# Patient Record
Sex: MALE | Race: BLACK OR AFRICAN AMERICAN | Employment: OTHER | URBAN - METROPOLITAN AREA
[De-identification: names, ages, dates, MRNs, and addresses within clinical notes are randomized per-mention and may not be internally consistent; named-entity substitution may affect disease eponyms.]

---

## 2018-06-29 ENCOUNTER — HOSPITAL ENCOUNTER (OUTPATIENT)
Age: 63
Discharge: HOME | End: 2018-07-01
Payer: COMMERCIAL

## 2018-06-29 ENCOUNTER — HOSPITAL ENCOUNTER (OUTPATIENT)
Dept: GENERAL RADIOLOGY | Age: 63
Discharge: HOME | End: 2018-07-01
Payer: COMMERCIAL

## 2018-06-29 DIAGNOSIS — Z72.0 TOBACCO ABUSE: ICD-10-CM

## 2018-06-29 PROCEDURE — 71046 X-RAY EXAM CHEST 2 VIEWS: CPT

## 2018-09-03 ENCOUNTER — HOSPITAL ENCOUNTER (EMERGENCY)
Age: 63
Discharge: HOME | End: 2018-09-03
Attending: EMERGENCY MEDICINE
Payer: COMMERCIAL

## 2018-09-03 ENCOUNTER — APPOINTMENT (OUTPATIENT)
Dept: GENERAL RADIOLOGY | Age: 63
End: 2018-09-03
Payer: COMMERCIAL

## 2018-09-03 VITALS
HEART RATE: 91 BPM | HEIGHT: 69 IN | WEIGHT: 150 LBS | RESPIRATION RATE: 16 BRPM | TEMPERATURE: 98.1 F | BODY MASS INDEX: 22.22 KG/M2 | DIASTOLIC BLOOD PRESSURE: 92 MMHG | OXYGEN SATURATION: 98 % | SYSTOLIC BLOOD PRESSURE: 146 MMHG

## 2018-09-03 DIAGNOSIS — L08.9 FINGER INFECTION: Primary | ICD-10-CM

## 2018-09-03 LAB
ALBUMIN SERPL-MCNC: 4.1 G/DL (ref 3.5–5.2)
ALP BLD-CCNC: 83 U/L (ref 40–129)
ALT SERPL-CCNC: 11 U/L (ref 0–40)
ANION GAP SERPL CALCULATED.3IONS-SCNC: 14 MMOL/L (ref 7–16)
AST SERPL-CCNC: 20 U/L (ref 0–39)
BASOPHILS ABSOLUTE: 0.02 E9/L (ref 0–0.2)
BASOPHILS RELATIVE PERCENT: 0.3 % (ref 0–2)
BILIRUB SERPL-MCNC: 0.3 MG/DL (ref 0–1.2)
BUN BLDV-MCNC: 13 MG/DL (ref 8–23)
C-REACTIVE PROTEIN: 4 MG/DL (ref 0–0.4)
CALCIUM SERPL-MCNC: 10.2 MG/DL (ref 8.6–10.2)
CHLORIDE BLD-SCNC: 96 MMOL/L (ref 98–107)
CO2: 28 MMOL/L (ref 22–29)
CREAT SERPL-MCNC: 1 MG/DL (ref 0.7–1.2)
EOSINOPHILS ABSOLUTE: 0.04 E9/L (ref 0.05–0.5)
EOSINOPHILS RELATIVE PERCENT: 0.6 % (ref 0–6)
GFR AFRICAN AMERICAN: >60
GFR NON-AFRICAN AMERICAN: >60 ML/MIN/1.73
GLUCOSE BLD-MCNC: 106 MG/DL (ref 74–109)
HCT VFR BLD CALC: 44 % (ref 37–54)
HEMOGLOBIN: 14.1 G/DL (ref 12.5–16.5)
IMMATURE GRANULOCYTES #: 0.02 E9/L
IMMATURE GRANULOCYTES %: 0.3 % (ref 0–5)
LACTIC ACID: 1.4 MMOL/L (ref 0.5–2.2)
LYMPHOCYTES ABSOLUTE: 2.19 E9/L (ref 1.5–4)
LYMPHOCYTES RELATIVE PERCENT: 33.3 % (ref 20–42)
MCH RBC QN AUTO: 23.7 PG (ref 26–35)
MCHC RBC AUTO-ENTMCNC: 32 % (ref 32–34.5)
MCV RBC AUTO: 74.1 FL (ref 80–99.9)
MONOCYTES ABSOLUTE: 0.33 E9/L (ref 0.1–0.95)
MONOCYTES RELATIVE PERCENT: 5 % (ref 2–12)
NEUTROPHILS ABSOLUTE: 3.97 E9/L (ref 1.8–7.3)
NEUTROPHILS RELATIVE PERCENT: 60.5 % (ref 43–80)
PDW BLD-RTO: 16.3 FL (ref 11.5–15)
PLATELET # BLD: 346 E9/L (ref 130–450)
PMV BLD AUTO: 9.6 FL (ref 7–12)
POTASSIUM SERPL-SCNC: 3.7 MMOL/L (ref 3.5–5)
RBC # BLD: 5.94 E12/L (ref 3.8–5.8)
SEDIMENTATION RATE, ERYTHROCYTE: 16 MM/HR (ref 0–15)
SODIUM BLD-SCNC: 138 MMOL/L (ref 132–146)
TOTAL PROTEIN: 8.7 G/DL (ref 6.4–8.3)
WBC # BLD: 6.6 E9/L (ref 4.5–11.5)

## 2018-09-03 PROCEDURE — 85025 COMPLETE CBC W/AUTO DIFF WBC: CPT

## 2018-09-03 PROCEDURE — 86140 C-REACTIVE PROTEIN: CPT

## 2018-09-03 PROCEDURE — 80053 COMPREHEN METABOLIC PANEL: CPT

## 2018-09-03 PROCEDURE — 10060 I&D ABSCESS SIMPLE/SINGLE: CPT

## 2018-09-03 PROCEDURE — 73120 X-RAY EXAM OF HAND: CPT

## 2018-09-03 PROCEDURE — 99283 EMERGENCY DEPT VISIT LOW MDM: CPT

## 2018-09-03 PROCEDURE — 6370000000 HC RX 637 (ALT 250 FOR IP): Performed by: PHYSICIAN ASSISTANT

## 2018-09-03 PROCEDURE — 85651 RBC SED RATE NONAUTOMATED: CPT

## 2018-09-03 PROCEDURE — 83605 ASSAY OF LACTIC ACID: CPT

## 2018-09-03 PROCEDURE — 36415 COLL VENOUS BLD VENIPUNCTURE: CPT

## 2018-09-03 RX ORDER — BACITRACIN, NEOMYCIN, POLYMYXIN B 400; 3.5; 5 [USP'U]/G; MG/G; [USP'U]/G
OINTMENT TOPICAL
Qty: 30 G | Refills: 0 | Status: SHIPPED | OUTPATIENT
Start: 2018-09-03 | End: 2018-09-13

## 2018-09-03 RX ORDER — NAPROXEN 500 MG/1
500 TABLET ORAL 2 TIMES DAILY
Qty: 14 TABLET | Refills: 0 | Status: SHIPPED | OUTPATIENT
Start: 2018-09-03 | End: 2019-08-10 | Stop reason: ALTCHOICE

## 2018-09-03 RX ORDER — SULFAMETHOXAZOLE AND TRIMETHOPRIM 800; 160 MG/1; MG/1
1 TABLET ORAL ONCE
Status: COMPLETED | OUTPATIENT
Start: 2018-09-03 | End: 2018-09-03

## 2018-09-03 RX ORDER — CEPHALEXIN 500 MG/1
500 CAPSULE ORAL ONCE
Status: COMPLETED | OUTPATIENT
Start: 2018-09-03 | End: 2018-09-03

## 2018-09-03 RX ORDER — CEPHALEXIN 500 MG/1
500 CAPSULE ORAL 2 TIMES DAILY
Qty: 14 CAPSULE | Refills: 0 | Status: SHIPPED | OUTPATIENT
Start: 2018-09-03 | End: 2018-09-10

## 2018-09-03 RX ORDER — SULFAMETHOXAZOLE AND TRIMETHOPRIM 800; 160 MG/1; MG/1
1 TABLET ORAL 2 TIMES DAILY
Qty: 14 TABLET | Refills: 0 | Status: SHIPPED | OUTPATIENT
Start: 2018-09-03 | End: 2018-09-10

## 2018-09-03 RX ADMIN — CEPHALEXIN 500 MG: 500 CAPSULE ORAL at 15:51

## 2018-09-03 RX ADMIN — SULFAMETHOXAZOLE AND TRIMETHOPRIM 1 TABLET: 800; 160 TABLET ORAL at 15:51

## 2018-09-03 NOTE — ED PROVIDER NOTES
Capillary refill: normal.  Hand: Right dorsal & volar. Tenderness: none. Swelling: None. Deformity: no.             Skin:  no erythema, rash or wounds noted. Wrist:               Tenderness:  none. Swelling: None. Deformity: no.             ROM: full range of motion. Skin:  no erythema, rash or wounds noted. Lymphatics: No lymphangitis or adenopathy noted. Neurological:  Oriented. Motor functions intact. t.       Lab / Imaging Results   (All laboratory and radiology results have been personally reviewed by myself)  Labs:  Results for orders placed or performed during the hospital encounter of 09/03/18   CBC Auto Differential   Result Value Ref Range    WBC 6.6 4.5 - 11.5 E9/L    RBC 5.94 (H) 3.80 - 5.80 E12/L    Hemoglobin 14.1 12.5 - 16.5 g/dL    Hematocrit 44.0 37.0 - 54.0 %    MCV 74.1 (L) 80.0 - 99.9 fL    MCH 23.7 (L) 26.0 - 35.0 pg    MCHC 32.0 32.0 - 34.5 %    RDW 16.3 (H) 11.5 - 15.0 fL    Platelets 035 387 - 756 E9/L    MPV 9.6 7.0 - 12.0 fL    Neutrophils % 60.5 43.0 - 80.0 %    Immature Granulocytes % 0.3 0.0 - 5.0 %    Lymphocytes % 33.3 20.0 - 42.0 %    Monocytes % 5.0 2.0 - 12.0 %    Eosinophils % 0.6 0.0 - 6.0 %    Basophils % 0.3 0.0 - 2.0 %    Neutrophils # 3.97 1.80 - 7.30 E9/L    Immature Granulocytes # 0.02 E9/L    Lymphocytes # 2.19 1.50 - 4.00 E9/L    Monocytes # 0.33 0.10 - 0.95 E9/L    Eosinophils # 0.04 (L) 0.05 - 0.50 E9/L    Basophils # 0.02 0.00 - 0.20 E9/L   Comprehensive Metabolic Panel   Result Value Ref Range    Sodium 138 132 - 146 mmol/L    Potassium 3.7 3.5 - 5.0 mmol/L    Chloride 96 (L) 98 - 107 mmol/L    CO2 28 22 - 29 mmol/L    Anion Gap 14 7 - 16 mmol/L    Glucose 106 74 - 109 mg/dL    BUN 13 8 - 23 mg/dL    CREATININE 1.0 0.7 - 1.2 mg/dL    GFR Non-African American >60 >=60 mL/min/1.73    GFR African American >60     Calcium 10.2 8.6 - 10.2 mg/dL    Total Protein 8.7 (H) 6.4 - 8.3 g/dL    Alb 4.1 3.5 - 5.2 g/dL    Total Bilirubin 0.3 0.0 - 1.2 mg/dL    Alkaline Phosphatase 83 40 - 129 U/L    ALT 11 0 - 40 U/L    AST 20 0 - 39 U/L   Lactic Acid, Plasma   Result Value Ref Range    Lactic Acid 1.4 0.5 - 2.2 mmol/L   C-Reactive Protein   Result Value Ref Range    CRP 4.0 (H) 0.0 - 0.4 mg/dL       Imaging: All Radiology results interpreted by Radiologist unless otherwise noted. XR HAND RIGHT (2 VIEWS)   Final Result   Nonspecific soft tissue swelling of the right fifth finger. ED Course / Medical Decision Making     Medications   sulfamethoxazole-trimethoprim (BACTRIM DS;SEPTRA DS) 800-160 MG per tablet 1 tablet (not administered)   cephALEXin (KEFLEX) capsule 500 mg (not administered)        Consult(s):   None    Procedure(s):     PROCEDURE  9/3/18       Time: 1500    INCISION AND DRAINAGE  Risks, benefits and alternatives (for applicable procedures below) described. Performed By: MAE Burns and Dr. Jp Perez. Indication: Abscess  Informed consent obtained: The patient was counseled regarding the procedure, it's indications, risks, potential complications and alternatives and any questions were answered. Consent was obtained. .  Prep: The skin was cleansed with povidone iodine and draped in a sterile fashion. Anesthetic: The wound area was anesthetized with Lidocaine 1% without epinephrine. Incision: Soft tissue abscess of right 5th digit was Incised by scalpal and minimal, purulent fluid was drained. A wound culture was not obtained. The wound  was not irrigated and was not packed with iodoform gauze. The wound was then covered with a sterile dressing. Patient tolerated the procedure well. Medical Decision Making:    Patient in no acute distress. Vital signs stable. Dr. Jp Perze evaluated the patient. Lab work and x-ray ordered. No leukocytosis. Lactic acid normal. Slight elevation in sed rate and CRP. I and D performed with purulent drainage.  Patient tolerated the procedure

## 2018-09-03 NOTE — ED NOTES
Spoke with lab, stated they are running add on sed rate and c-reactive protein      [address], RN  09/03/18 4828

## 2019-04-06 ENCOUNTER — HOSPITAL ENCOUNTER (OUTPATIENT)
Age: 64
Discharge: HOME | End: 2019-04-06
Payer: COMMERCIAL

## 2019-04-06 LAB
BUN BLDV-MCNC: 17 MG/DL (ref 8–23)
CREAT SERPL-MCNC: 1 MG/DL (ref 0.7–1.2)
GFR AFRICAN AMERICAN: >60
GFR NON-AFRICAN AMERICAN: >60 ML/MIN/1.73

## 2019-04-06 PROCEDURE — 84520 ASSAY OF UREA NITROGEN: CPT

## 2019-04-06 PROCEDURE — 82565 ASSAY OF CREATININE: CPT

## 2019-04-06 PROCEDURE — 36415 COLL VENOUS BLD VENIPUNCTURE: CPT

## 2019-04-22 ENCOUNTER — HOSPITAL ENCOUNTER (OUTPATIENT)
Dept: MRI IMAGING | Age: 64
Discharge: HOME | End: 2019-04-24
Payer: COMMERCIAL

## 2019-04-22 DIAGNOSIS — R22.1 MASS OF LEFT SIDE OF NECK: ICD-10-CM

## 2019-04-22 PROCEDURE — 6360000004 HC RX CONTRAST MEDICATION: Performed by: RADIOLOGY

## 2019-04-22 PROCEDURE — 70543 MRI ORBT/FAC/NCK W/O &W/DYE: CPT

## 2019-04-22 PROCEDURE — A9579 GAD-BASE MR CONTRAST NOS,1ML: HCPCS | Performed by: RADIOLOGY

## 2019-04-22 RX ADMIN — GADOTERIDOL 14 ML: 279.3 INJECTION, SOLUTION INTRAVENOUS at 19:31

## 2019-08-10 ENCOUNTER — APPOINTMENT (OUTPATIENT)
Dept: GENERAL RADIOLOGY | Age: 64
DRG: 580 | End: 2019-08-10
Payer: MEDICARE

## 2019-08-10 ENCOUNTER — HOSPITAL ENCOUNTER (INPATIENT)
Age: 64
LOS: 2 days | Discharge: HOME | DRG: 580 | End: 2019-08-13
Attending: EMERGENCY MEDICINE | Admitting: INTERNAL MEDICINE
Payer: MEDICARE

## 2019-08-10 DIAGNOSIS — L03.119 CELLULITIS OF HAND: Primary | ICD-10-CM

## 2019-08-10 LAB
ALBUMIN SERPL-MCNC: 4.5 G/DL (ref 3.5–5.2)
ALP BLD-CCNC: 77 U/L (ref 40–129)
ALT SERPL-CCNC: 16 U/L (ref 0–40)
ANION GAP SERPL CALCULATED.3IONS-SCNC: 16 MMOL/L (ref 7–16)
AST SERPL-CCNC: 19 U/L (ref 0–39)
BASOPHILS ABSOLUTE: 0.02 E9/L (ref 0–0.2)
BASOPHILS RELATIVE PERCENT: 0.1 % (ref 0–2)
BILIRUB SERPL-MCNC: 0.4 MG/DL (ref 0–1.2)
BUN BLDV-MCNC: 12 MG/DL (ref 8–23)
C-REACTIVE PROTEIN: 16 MG/DL (ref 0–0.4)
CALCIUM SERPL-MCNC: 9.8 MG/DL (ref 8.6–10.2)
CHLORIDE BLD-SCNC: 94 MMOL/L (ref 98–107)
CO2: 21 MMOL/L (ref 22–29)
CREAT SERPL-MCNC: 1 MG/DL (ref 0.7–1.2)
EOSINOPHILS ABSOLUTE: 0 E9/L (ref 0.05–0.5)
EOSINOPHILS RELATIVE PERCENT: 0 % (ref 0–6)
GFR AFRICAN AMERICAN: >60
GFR NON-AFRICAN AMERICAN: >60 ML/MIN/1.73
GLUCOSE BLD-MCNC: 91 MG/DL (ref 74–99)
HCT VFR BLD CALC: 45.6 % (ref 37–54)
HEMOGLOBIN: 14.8 G/DL (ref 12.5–16.5)
IMMATURE GRANULOCYTES #: 0.11 E9/L
IMMATURE GRANULOCYTES %: 0.6 % (ref 0–5)
LACTIC ACID, SEPSIS: 1.3 MMOL/L (ref 0.5–1.9)
LYMPHOCYTES ABSOLUTE: 1.35 E9/L (ref 1.5–4)
LYMPHOCYTES RELATIVE PERCENT: 7.5 % (ref 20–42)
MCH RBC QN AUTO: 24.2 PG (ref 26–35)
MCHC RBC AUTO-ENTMCNC: 32.5 % (ref 32–34.5)
MCV RBC AUTO: 74.5 FL (ref 80–99.9)
MONOCYTES ABSOLUTE: 0.8 E9/L (ref 0.1–0.95)
MONOCYTES RELATIVE PERCENT: 4.5 % (ref 2–12)
NEUTROPHILS ABSOLUTE: 15.64 E9/L (ref 1.8–7.3)
NEUTROPHILS RELATIVE PERCENT: 87.3 % (ref 43–80)
PDW BLD-RTO: 17.2 FL (ref 11.5–15)
PLATELET # BLD: 236 E9/L (ref 130–450)
PMV BLD AUTO: 9.4 FL (ref 7–12)
POTASSIUM SERPL-SCNC: 3.6 MMOL/L (ref 3.5–5)
RBC # BLD: 6.12 E12/L (ref 3.8–5.8)
SEDIMENTATION RATE, ERYTHROCYTE: 3 MM/HR (ref 0–15)
SODIUM BLD-SCNC: 131 MMOL/L (ref 132–146)
TOTAL PROTEIN: 8.1 G/DL (ref 6.4–8.3)
WBC # BLD: 17.9 E9/L (ref 4.5–11.5)

## 2019-08-10 PROCEDURE — 36415 COLL VENOUS BLD VENIPUNCTURE: CPT

## 2019-08-10 PROCEDURE — 80053 COMPREHEN METABOLIC PANEL: CPT

## 2019-08-10 PROCEDURE — 96372 THER/PROPH/DIAG INJ SC/IM: CPT

## 2019-08-10 PROCEDURE — 6360000002 HC RX W HCPCS: Performed by: PHYSICIAN ASSISTANT

## 2019-08-10 PROCEDURE — 99284 EMERGENCY DEPT VISIT MOD MDM: CPT

## 2019-08-10 PROCEDURE — 87040 BLOOD CULTURE FOR BACTERIA: CPT

## 2019-08-10 PROCEDURE — 6370000000 HC RX 637 (ALT 250 FOR IP): Performed by: INTERNAL MEDICINE

## 2019-08-10 PROCEDURE — G0378 HOSPITAL OBSERVATION PER HR: HCPCS

## 2019-08-10 PROCEDURE — 86140 C-REACTIVE PROTEIN: CPT

## 2019-08-10 PROCEDURE — 85651 RBC SED RATE NONAUTOMATED: CPT

## 2019-08-10 PROCEDURE — 85025 COMPLETE CBC W/AUTO DIFF WBC: CPT

## 2019-08-10 PROCEDURE — 73130 X-RAY EXAM OF HAND: CPT

## 2019-08-10 PROCEDURE — 6360000002 HC RX W HCPCS: Performed by: INTERNAL MEDICINE

## 2019-08-10 PROCEDURE — 96375 TX/PRO/DX INJ NEW DRUG ADDON: CPT

## 2019-08-10 PROCEDURE — 99253 IP/OBS CNSLTJ NEW/EST LOW 45: CPT | Performed by: ORTHOPAEDIC SURGERY

## 2019-08-10 PROCEDURE — 96367 TX/PROPH/DG ADDL SEQ IV INF: CPT

## 2019-08-10 PROCEDURE — 2580000003 HC RX 258: Performed by: PHYSICIAN ASSISTANT

## 2019-08-10 PROCEDURE — 96365 THER/PROPH/DIAG IV INF INIT: CPT

## 2019-08-10 PROCEDURE — 83605 ASSAY OF LACTIC ACID: CPT

## 2019-08-10 PROCEDURE — 2580000003 HC RX 258: Performed by: INTERNAL MEDICINE

## 2019-08-10 PROCEDURE — 0J9K0ZZ DRAINAGE OF LEFT HAND SUBCUTANEOUS TISSUE AND FASCIA, OPEN APPROACH: ICD-10-PCS | Performed by: ORTHOPAEDIC SURGERY

## 2019-08-10 RX ORDER — MORPHINE SULFATE 4 MG/ML
4 INJECTION, SOLUTION INTRAMUSCULAR; INTRAVENOUS ONCE
Status: COMPLETED | OUTPATIENT
Start: 2019-08-10 | End: 2019-08-10

## 2019-08-10 RX ORDER — MELOXICAM 15 MG/1
15 TABLET ORAL DAILY PRN
COMMUNITY
End: 2020-06-05

## 2019-08-10 RX ORDER — ONDANSETRON 2 MG/ML
4 INJECTION INTRAMUSCULAR; INTRAVENOUS ONCE
Status: COMPLETED | OUTPATIENT
Start: 2019-08-10 | End: 2019-08-10

## 2019-08-10 RX ORDER — VITAMIN B COMPLEX
1 CAPSULE ORAL DAILY
COMMUNITY

## 2019-08-10 RX ORDER — ONDANSETRON 2 MG/ML
4 INJECTION INTRAMUSCULAR; INTRAVENOUS EVERY 6 HOURS PRN
Status: DISCONTINUED | OUTPATIENT
Start: 2019-08-10 | End: 2019-08-13 | Stop reason: HOSPADM

## 2019-08-10 RX ORDER — SODIUM CHLORIDE 9 MG/ML
INJECTION, SOLUTION INTRAVENOUS CONTINUOUS
Status: DISCONTINUED | OUTPATIENT
Start: 2019-08-10 | End: 2019-08-13

## 2019-08-10 RX ORDER — SODIUM CHLORIDE 0.9 % (FLUSH) 0.9 %
10 SYRINGE (ML) INJECTION EVERY 12 HOURS SCHEDULED
Status: DISCONTINUED | OUTPATIENT
Start: 2019-08-10 | End: 2019-08-13 | Stop reason: HOSPADM

## 2019-08-10 RX ORDER — CHOLECALCIFEROL (VITAMIN D3) 1250 MCG
50000 CAPSULE ORAL WEEKLY
COMMUNITY

## 2019-08-10 RX ORDER — HYDROCHLOROTHIAZIDE 25 MG/1
25 TABLET ORAL DAILY
COMMUNITY

## 2019-08-10 RX ORDER — HYDROCHLOROTHIAZIDE 25 MG/1
25 TABLET ORAL DAILY
Status: DISCONTINUED | OUTPATIENT
Start: 2019-08-10 | End: 2019-08-13 | Stop reason: HOSPADM

## 2019-08-10 RX ORDER — SODIUM CHLORIDE 0.9 % (FLUSH) 0.9 %
10 SYRINGE (ML) INJECTION PRN
Status: DISCONTINUED | OUTPATIENT
Start: 2019-08-10 | End: 2019-08-13 | Stop reason: HOSPADM

## 2019-08-10 RX ORDER — 0.9 % SODIUM CHLORIDE 0.9 %
1000 INTRAVENOUS SOLUTION INTRAVENOUS ONCE
Status: COMPLETED | OUTPATIENT
Start: 2019-08-10 | End: 2019-08-10

## 2019-08-10 RX ORDER — M-VIT,TX,IRON,MINS/CALC/FOLIC 27MG-0.4MG
1 TABLET ORAL DAILY
COMMUNITY

## 2019-08-10 RX ORDER — ACETAMINOPHEN 325 MG/1
650 TABLET ORAL EVERY 4 HOURS PRN
Status: DISCONTINUED | OUTPATIENT
Start: 2019-08-10 | End: 2019-08-13 | Stop reason: HOSPADM

## 2019-08-10 RX ADMIN — ONDANSETRON HYDROCHLORIDE 4 MG: 2 SOLUTION INTRAMUSCULAR; INTRAVENOUS at 16:46

## 2019-08-10 RX ADMIN — SODIUM CHLORIDE 1000 ML: 9 INJECTION, SOLUTION INTRAVENOUS at 16:40

## 2019-08-10 RX ADMIN — CEFAZOLIN SODIUM 2 G: 10 INJECTION, POWDER, FOR SOLUTION INTRAVENOUS at 16:39

## 2019-08-10 RX ADMIN — ENOXAPARIN SODIUM 40 MG: 40 INJECTION SUBCUTANEOUS at 21:25

## 2019-08-10 RX ADMIN — MORPHINE SULFATE 4 MG: 4 INJECTION, SOLUTION INTRAMUSCULAR; INTRAVENOUS at 16:46

## 2019-08-10 RX ADMIN — SODIUM CHLORIDE: 9 INJECTION, SOLUTION INTRAVENOUS at 18:35

## 2019-08-10 RX ADMIN — HYDROCHLOROTHIAZIDE 25 MG: 25 TABLET ORAL at 21:35

## 2019-08-10 RX ADMIN — AMPICILLIN SODIUM AND SULBACTAM SODIUM 3 G: 2; 1 INJECTION, POWDER, FOR SOLUTION INTRAMUSCULAR; INTRAVENOUS at 21:24

## 2019-08-10 RX ADMIN — VANCOMYCIN HYDROCHLORIDE 1000 MG: 1 INJECTION, POWDER, LYOPHILIZED, FOR SOLUTION INTRAVENOUS at 22:46

## 2019-08-10 ASSESSMENT — PAIN DESCRIPTION - DESCRIPTORS: DESCRIPTORS: ACHING

## 2019-08-10 ASSESSMENT — PAIN SCALES - GENERAL
PAINLEVEL_OUTOF10: 0
PAINLEVEL_OUTOF10: 7
PAINLEVEL_OUTOF10: 0
PAINLEVEL_OUTOF10: 8
PAINLEVEL_OUTOF10: 7

## 2019-08-10 ASSESSMENT — PAIN DESCRIPTION - ORIENTATION: ORIENTATION: LEFT

## 2019-08-10 ASSESSMENT — PAIN DESCRIPTION - FREQUENCY: FREQUENCY: CONTINUOUS

## 2019-08-10 ASSESSMENT — PAIN DESCRIPTION - LOCATION: LOCATION: HAND

## 2019-08-10 ASSESSMENT — PAIN DESCRIPTION - PAIN TYPE: TYPE: ACUTE PAIN

## 2019-08-10 NOTE — ED PROVIDER NOTES
ED Attending  CC: No       Department of Emergency Medicine   ED  Provider Note  Admit Date/RoomTime: 8/10/2019 10:37 AM  ED Room: 37/37  Chief Complaint   Hand Pain (left hand swollen, doesnt know if got bit by something )    History of Present Illness   Source of history provided by:  patient. History/Exam Limitations: none. Hossein Oliva is a 75 y.o. old male presenting to the emergency department by private vehicle and ambulatory, for Left hand pain which occured 2 day(s) prior to arrival.  Cause of complaint: unknown while at home. There has been a history of no prior problems with this area in the past.   He is right handed. Since onset the symptoms have been moderate in degree. His pain is aggraveated by movement, use and palpation and relieved by nothing. Patient states he is unsure if something bit him to the left ring finger. Denies any fever or chills. There is been no nausea or vomiting. States full range of motion of the left hand with minimal discomfort. ROS    Pertinent positives and negatives are stated within HPI, all other systems reviewed and are negative. Past Surgical History:  has no past surgical history on file. Social History:  reports that he has never smoked. He has never used smokeless tobacco. He reports that he does not drink alcohol or use drugs. Family History: family history is not on file. Allergies: Patient has no known allergies. Physical Exam           ED Triage Vitals   BP Temp Temp src Pulse Resp SpO2 Height Weight   08/10/19 1035 08/10/19 1035 -- 08/10/19 1022 08/10/19 1035 08/10/19 1022 -- 08/10/19 1035   (!) 149/101 98 °F (36.7 °C)  122 18 94 %  145 lb (65.8 kg)      Oxygen Saturation Interpretation: Normal.    Constitutional:  Alert, development consistent with age. Neck:  Normal ROM. Supple. Non-tender. Hand: Left dorsal 2nd, 3rd, 4th mid and distal aspect  Metacarpal .              Tenderness: moderate. Swelling: Mild. Deformity: no.               Skin:  warmth, tenderness, swelling and erythema. Neurovascular: Motor deficit: none. Sensory deficit:   none. Pulse deficit: none. Capillary refill: normal.  Fingers:  4th            Tenderness:  mild. Swelling: Moderate. Deformity: no.              ROM: full range with pain. Skin:  tenderness, swelling and no erythema, rash or wounds noted. Wrist:               Tenderness:  none. Swelling: None. Deformity: no.             ROM: full range of motion. Skin:  no erythema, rash or wounds noted. Lymphatics: No lymphangitis or adenopathy noted. Neurological:  Oriented. Motor functions intact. t.     Lab / Imaging Results   (All laboratory and radiology results have been personally reviewed by myself)  Labs:  Results for orders placed or performed during the hospital encounter of 08/10/19   CBC Auto Differential   Result Value Ref Range    WBC 17.9 (H) 4.5 - 11.5 E9/L    RBC 6.12 (H) 3.80 - 5.80 E12/L    Hemoglobin 14.8 12.5 - 16.5 g/dL    Hematocrit 45.6 37.0 - 54.0 %    MCV 74.5 (L) 80.0 - 99.9 fL    MCH 24.2 (L) 26.0 - 35.0 pg    MCHC 32.5 32.0 - 34.5 %    RDW 17.2 (H) 11.5 - 15.0 fL    Platelets 485 366 - 014 E9/L    MPV 9.4 7.0 - 12.0 fL    Neutrophils % 87.3 (H) 43.0 - 80.0 %    Immature Granulocytes % 0.6 0.0 - 5.0 %    Lymphocytes % 7.5 (L) 20.0 - 42.0 %    Monocytes % 4.5 2.0 - 12.0 %    Eosinophils % 0.0 0.0 - 6.0 %    Basophils % 0.1 0.0 - 2.0 %    Neutrophils # 15.64 (H) 1.80 - 7.30 E9/L    Immature Granulocytes # 0.11 E9/L    Lymphocytes # 1.35 (L) 1.50 - 4.00 E9/L    Monocytes # 0.80 0.10 - 0.95 E9/L    Eosinophils # 0.00 (L) 0.05 - 0.50 E9/L    Basophils # 0.02 0.00 - 0.20 E9/L   Comprehensive Metabolic Panel   Result Value Ref Range    Sodium 131 (L) 132 - 146 mmol/L    Potassium 3.6 3.5 - 5.0 mmol/L    Chloride 94 (L) 98 - 107 mmol/L    CO2 21 (L) 22 - 29 mmol/L    Anion Gap 16 7 - 16 mmol/L    Glucose 91 74 - 99 mg/dL    BUN 12 8 - 23 mg/dL    CREATININE 1.0 0.7 - 1.2 mg/dL    GFR Non-African American >60 >=60 mL/min/1.73    GFR African American >60     Calcium 9.8 8.6 - 10.2 mg/dL    Total Protein 8.1 6.4 - 8.3 g/dL    Alb 4.5 3.5 - 5.2 g/dL    Total Bilirubin 0.4 0.0 - 1.2 mg/dL    Alkaline Phosphatase 77 40 - 129 U/L    ALT 16 0 - 40 U/L    AST 19 0 - 39 U/L   Lactate, Sepsis   Result Value Ref Range    Lactic Acid, Sepsis 1.3 0.5 - 1.9 mmol/L   Sedimentation Rate   Result Value Ref Range    Sed Rate 3 0 - 15 mm/Hr   C-reactive protein   Result Value Ref Range    CRP 16.0 (H) 0.0 - 0.4 mg/dL     Imaging: All Radiology results interpreted by Radiologist unless otherwise noted. XR HAND LEFT (MIN 3 VIEWS)   Final Result   No acute process                 ED Course / Medical Decision Making     Medications   ceFAZolin (ANCEF) 2 g in dextrose 5 % 50 mL IVPB (0 g Intravenous Stopped 8/10/19 1719)   0.9 % sodium chloride bolus (0 mLs Intravenous Stopped 8/10/19 1724)   morphine sulfate (PF) injection 4 mg (4 mg Intravenous Given 8/10/19 1646)   ondansetron (ZOFRAN) injection 4 mg (4 mg Intravenous Given 8/10/19 1646)     Consult(s):   Orthopedic  Spoke with orthopedic resident discussed patient case. He was able to perform I&D bedside and did get some drainage from the area. Recommends medical admission. Hospitalist  Spoke with Dr. Kia Gil and discussed patient case. He will accept the patient. Procedure(s):   none    MDM:   Vital signs improved. Patient in no acute distress. Patient presents to the emergency department for 2 days worth of left hand pain and swelling. Unknown source. X-ray and lab work obtained. X-ray shows no acute process. Lab work shows a significant leukocytosis of 17.9 no lactic acidosis. CRP 16. Orthopedics consulted. They evaluated the patient in the emergency department.   He was able to get some drainage from the left

## 2019-08-11 PROBLEM — L02.512 ABSCESS OF LEFT MIDDLE FINGER: Status: ACTIVE | Noted: 2019-08-11

## 2019-08-11 PROBLEM — L02.512 ABSCESS OF FINGER OF LEFT HAND: Status: ACTIVE | Noted: 2019-08-11

## 2019-08-11 PROBLEM — E87.1 HYPONATREMIA: Status: ACTIVE | Noted: 2019-08-11

## 2019-08-11 PROBLEM — I10 HTN (HYPERTENSION): Status: ACTIVE | Noted: 2019-08-11

## 2019-08-11 PROBLEM — D72.829 LEUKOCYTOSIS: Status: ACTIVE | Noted: 2019-08-11

## 2019-08-11 LAB
ANION GAP SERPL CALCULATED.3IONS-SCNC: 14 MMOL/L (ref 7–16)
BUN BLDV-MCNC: 11 MG/DL (ref 8–23)
CALCIUM SERPL-MCNC: 9 MG/DL (ref 8.6–10.2)
CHLORIDE BLD-SCNC: 94 MMOL/L (ref 98–107)
CO2: 27 MMOL/L (ref 22–29)
CREAT SERPL-MCNC: 1.1 MG/DL (ref 0.7–1.2)
GFR AFRICAN AMERICAN: >60
GFR NON-AFRICAN AMERICAN: >60 ML/MIN/1.73
GLUCOSE BLD-MCNC: 110 MG/DL (ref 74–99)
HCT VFR BLD CALC: 42.5 % (ref 37–54)
HEMOGLOBIN: 13.5 G/DL (ref 12.5–16.5)
MCH RBC QN AUTO: 24 PG (ref 26–35)
MCHC RBC AUTO-ENTMCNC: 31.8 % (ref 32–34.5)
MCV RBC AUTO: 75.6 FL (ref 80–99.9)
PDW BLD-RTO: 16 FL (ref 11.5–15)
PLATELET # BLD: 198 E9/L (ref 130–450)
PMV BLD AUTO: 10 FL (ref 7–12)
POTASSIUM REFLEX MAGNESIUM: 3.6 MMOL/L (ref 3.5–5)
RBC # BLD: 5.62 E12/L (ref 3.8–5.8)
SODIUM BLD-SCNC: 135 MMOL/L (ref 132–146)
WBC # BLD: 14.1 E9/L (ref 4.5–11.5)

## 2019-08-11 PROCEDURE — 36415 COLL VENOUS BLD VENIPUNCTURE: CPT

## 2019-08-11 PROCEDURE — 85027 COMPLETE CBC AUTOMATED: CPT

## 2019-08-11 PROCEDURE — 87186 SC STD MICRODIL/AGAR DIL: CPT

## 2019-08-11 PROCEDURE — 1200000000 HC SEMI PRIVATE

## 2019-08-11 PROCEDURE — 87070 CULTURE OTHR SPECIMN AEROBIC: CPT

## 2019-08-11 PROCEDURE — 80048 BASIC METABOLIC PNL TOTAL CA: CPT

## 2019-08-11 PROCEDURE — 2580000003 HC RX 258: Performed by: INTERNAL MEDICINE

## 2019-08-11 PROCEDURE — 6370000000 HC RX 637 (ALT 250 FOR IP): Performed by: INTERNAL MEDICINE

## 2019-08-11 PROCEDURE — 6360000002 HC RX W HCPCS: Performed by: INTERNAL MEDICINE

## 2019-08-11 RX ADMIN — AMPICILLIN SODIUM AND SULBACTAM SODIUM 3 G: 2; 1 INJECTION, POWDER, FOR SOLUTION INTRAMUSCULAR; INTRAVENOUS at 02:03

## 2019-08-11 RX ADMIN — VANCOMYCIN HYDROCHLORIDE 1000 MG: 1 INJECTION, POWDER, LYOPHILIZED, FOR SOLUTION INTRAVENOUS at 20:03

## 2019-08-11 RX ADMIN — HYDROCHLOROTHIAZIDE 25 MG: 25 TABLET ORAL at 10:33

## 2019-08-11 RX ADMIN — SODIUM CHLORIDE: 9 INJECTION, SOLUTION INTRAVENOUS at 10:33

## 2019-08-11 RX ADMIN — VANCOMYCIN HYDROCHLORIDE 1000 MG: 1 INJECTION, POWDER, LYOPHILIZED, FOR SOLUTION INTRAVENOUS at 12:39

## 2019-08-11 RX ADMIN — AMPICILLIN SODIUM AND SULBACTAM SODIUM 3 G: 2; 1 INJECTION, POWDER, FOR SOLUTION INTRAMUSCULAR; INTRAVENOUS at 10:33

## 2019-08-11 RX ADMIN — AMPICILLIN SODIUM AND SULBACTAM SODIUM 3 G: 2; 1 INJECTION, POWDER, FOR SOLUTION INTRAMUSCULAR; INTRAVENOUS at 21:55

## 2019-08-11 RX ADMIN — AMPICILLIN SODIUM AND SULBACTAM SODIUM 3 G: 2; 1 INJECTION, POWDER, FOR SOLUTION INTRAMUSCULAR; INTRAVENOUS at 16:01

## 2019-08-11 ASSESSMENT — PAIN SCALES - GENERAL
PAINLEVEL_OUTOF10: 0

## 2019-08-11 NOTE — PROGRESS NOTES
Department of Orthopedic Surgery  Resident Progress Note    Patient seen and examined. Pain well controlled. No new complaints. States that he feels like the swelling has decreased. Able to flex and extend finger. Denies chest pain, shortness of breath, dizziness/lightheadedness.      VITALS:  BP (!) 134/58   Pulse 106   Temp 100.2 °F (37.9 °C) (Temporal)   Resp 16   Wt 145 lb (65.8 kg)   SpO2 96%   BMI 21.41 kg/m²     General: alert and oriented to person, place and time, well-developed and well-nourished, in no acute distress    MUSCULOSKELETAL:   left upper extremity:  · Dressing C/D/I  · Swelling improved  · Mild purulent drainage  · Compartments soft and compressible  · +AIN/PIN/Ulnar/Median/Radial nerve function intact grossly  · +2/4 Radial pulse, Cap refill <2 sec  · Sensation intact to touch in radial/ulnar/median nerve distributions to hand    CBC:   Lab Results   Component Value Date    WBC 17.9 08/10/2019    HGB 14.8 08/10/2019    HCT 45.6 08/10/2019     08/10/2019     PT/INR:  No results found for: PROTIME, INR      ASSESSMENT  · S/P bedside I&D left middle finger      PLAN      · Packing pulled today  · Awaiting wound culture  · Abx per ID  · Elevate hand  · Pain Control: IV and PO  · Monitor H&H  · Discuss with attending

## 2019-08-11 NOTE — H&P
discharge. Neck:  Supple  Heart:  RRR, no murmurs, gallops, rubs  Lungs:  CTA bilaterally, bilat symmetrical expansion, no wheeze, rales, or rhonchi  Abdomen:   Bowel sounds present, soft, nontender, no masses, no organomegaly, no peritoneal signs  Extremities:  No clubbing, cyanosis, or edema  Skin:  Warm and dry, no open lesions or rash  Neuro:  Cranial nerves 2-12 intact, no focal deficits  Breast: deferred  Rectal: deferred  Genitalia:  deferred    LABS:    CBC with Differential:    Lab Results   Component Value Date    WBC 17.9 08/10/2019    RBC 6.12 08/10/2019    HGB 14.8 08/10/2019    HCT 45.6 08/10/2019     08/10/2019    MCV 74.5 08/10/2019    MCH 24.2 08/10/2019    MCHC 32.5 08/10/2019    RDW 17.2 08/10/2019    SEGSPCT 46 03/22/2013    LYMPHOPCT 7.5 08/10/2019    MONOPCT 4.5 08/10/2019    BASOPCT 0.1 08/10/2019    MONOSABS 0.80 08/10/2019    LYMPHSABS 1.35 08/10/2019    EOSABS 0.00 08/10/2019    BASOSABS 0.02 08/10/2019     CMP:    Lab Results   Component Value Date     08/10/2019    K 3.6 08/10/2019    CL 94 08/10/2019    CO2 21 08/10/2019    BUN 12 08/10/2019    CREATININE 1.0 08/10/2019    GFRAA >60 08/10/2019    LABGLOM >60 08/10/2019    GLUCOSE 91 08/10/2019    GLUCOSE 88 07/01/2011    PROT 8.1 08/10/2019    LABALBU 4.5 08/10/2019    LABALBU 4.7 07/01/2011    CALCIUM 9.8 08/10/2019    BILITOT 0.4 08/10/2019    ALKPHOS 77 08/10/2019    AST 19 08/10/2019    ALT 16 08/10/2019     Magnesium:  No results found for: MG  Phosphorus:  No results found for: PHOS  PT/INR:  No results found for: PROTIME, INR  Last 3 Troponin:  No results found for: TROPONINI  U/A:  No results found for: NITRITE, COLORU, PROTEINU, PHUR, LABCAST, WBCUA, RBCUA, MUCUS, TRICHOMONAS, YEAST, BACTERIA, CLARITYU, SPECGRAV, LEUKOCYTESUR, UROBILINOGEN, BILIRUBINUR, BLOODU, GLUCOSEU, AMORPHOUS  ABG:  No results found for: PH, PCO2, PO2, HCO3, BE, THGB, TCO2, O2SAT  HgBA1c:  No results found for: LABA1C  FLP:    Lab Results

## 2019-08-11 NOTE — CONSULTS
dislocation    IMPRESSION:  · Left hand cellulitis  · Left middle finger abscess    PLAN:  · After verbal consent was obtained the hand was prepared in a sterile fashion. A digital block was performed using 1% lidocaine. About 8 cc of lidocaine was injected into the finger. The finger was then incised the proximal dorsal surface through the subcutaneous tissues using a 15 blade. A tonsil was used to spread subcutaneously. A small amount of purulent drainage was expressed from the dorsal subcutaneous area and radial sided subcutaneous area. Half-inch iodoform packing was placed. The wound is dressed and wrapped in Kerlix and Ace bandage. · Packing to be pulled tomorrow  · Recommend medical admission for IV antibiotics  · No further orthopedic intervention at this time  · Elevate extremity  · Pain per admitting  · Discussed with Dr. Khanh Truong Attending    I have seen and evaluated the patient with the resident and agree with the above assessments on today's visit. I have performed the key components of the history and physical examination and concur completely with the findings and plans as documented above. Left middle finger dorsal abscess secondary to insect bite. Bedside I&D is performed and packing was placed. This was removed earlier today. Patient states hand feels much better and swelling continues to improve. Patient to continue with antibiotics and will continue to follow peripherally.     Electronically signed by   Cline Harada, DO  8/11/2019

## 2019-08-12 PROBLEM — I10 HTN (HYPERTENSION): Status: RESOLVED | Noted: 2019-08-11 | Resolved: 2019-08-12

## 2019-08-12 PROBLEM — E87.1 HYPONATREMIA: Status: RESOLVED | Noted: 2019-08-11 | Resolved: 2019-08-12

## 2019-08-12 PROBLEM — L03.119 CELLULITIS OF HAND: Status: RESOLVED | Noted: 2019-08-10 | Resolved: 2019-08-12

## 2019-08-12 PROBLEM — L02.512 ABSCESS OF FINGER OF LEFT HAND: Status: RESOLVED | Noted: 2019-08-11 | Resolved: 2019-08-12

## 2019-08-12 PROBLEM — D72.829 LEUKOCYTOSIS: Status: RESOLVED | Noted: 2019-08-11 | Resolved: 2019-08-12

## 2019-08-12 PROBLEM — I10 ESSENTIAL HYPERTENSION: Chronic | Status: ACTIVE | Noted: 2019-08-12

## 2019-08-12 PROCEDURE — 6360000002 HC RX W HCPCS: Performed by: INTERNAL MEDICINE

## 2019-08-12 PROCEDURE — 6370000000 HC RX 637 (ALT 250 FOR IP): Performed by: INTERNAL MEDICINE

## 2019-08-12 PROCEDURE — 1200000000 HC SEMI PRIVATE

## 2019-08-12 PROCEDURE — 2580000003 HC RX 258: Performed by: INTERNAL MEDICINE

## 2019-08-12 RX ADMIN — AMPICILLIN SODIUM AND SULBACTAM SODIUM 3 G: 2; 1 INJECTION, POWDER, FOR SOLUTION INTRAMUSCULAR; INTRAVENOUS at 04:14

## 2019-08-12 RX ADMIN — HYDROCHLOROTHIAZIDE 25 MG: 25 TABLET ORAL at 08:51

## 2019-08-12 RX ADMIN — VANCOMYCIN HYDROCHLORIDE 1000 MG: 1 INJECTION, POWDER, LYOPHILIZED, FOR SOLUTION INTRAVENOUS at 19:44

## 2019-08-12 RX ADMIN — SODIUM CHLORIDE: 9 INJECTION, SOLUTION INTRAVENOUS at 04:11

## 2019-08-12 RX ADMIN — AMPICILLIN SODIUM AND SULBACTAM SODIUM 3 G: 2; 1 INJECTION, POWDER, FOR SOLUTION INTRAMUSCULAR; INTRAVENOUS at 10:58

## 2019-08-12 RX ADMIN — VANCOMYCIN HYDROCHLORIDE 1000 MG: 1 INJECTION, POWDER, LYOPHILIZED, FOR SOLUTION INTRAVENOUS at 08:51

## 2019-08-12 RX ADMIN — ACETAMINOPHEN 650 MG: 325 TABLET, FILM COATED ORAL at 22:58

## 2019-08-12 ASSESSMENT — PAIN SCALES - GENERAL
PAINLEVEL_OUTOF10: 3
PAINLEVEL_OUTOF10: 8
PAINLEVEL_OUTOF10: 0

## 2019-08-12 ASSESSMENT — PAIN - FUNCTIONAL ASSESSMENT: PAIN_FUNCTIONAL_ASSESSMENT: PREVENTS OR INTERFERES SOME ACTIVE ACTIVITIES AND ADLS

## 2019-08-12 ASSESSMENT — PAIN DESCRIPTION - PROGRESSION: CLINICAL_PROGRESSION: NOT CHANGED

## 2019-08-12 ASSESSMENT — PAIN DESCRIPTION - ONSET: ONSET: ON-GOING

## 2019-08-12 ASSESSMENT — PAIN DESCRIPTION - FREQUENCY: FREQUENCY: INTERMITTENT

## 2019-08-12 ASSESSMENT — PAIN DESCRIPTION - PAIN TYPE: TYPE: ACUTE PAIN;SURGICAL PAIN

## 2019-08-12 ASSESSMENT — PAIN DESCRIPTION - LOCATION: LOCATION: HAND

## 2019-08-12 ASSESSMENT — PAIN DESCRIPTION - ORIENTATION: ORIENTATION: LEFT

## 2019-08-12 ASSESSMENT — PAIN DESCRIPTION - DESCRIPTORS: DESCRIPTORS: ACHING;THROBBING;DISCOMFORT

## 2019-08-12 NOTE — PROGRESS NOTES
Department of Orthopedic Surgery  Resident Progress Note    Patient seen and examined. Pain well controlled. No new complaints. States that he feels like the swelling has decreased. Able to flex and extend finger. Denies chest pain, shortness of breath, dizziness/lightheadedness.      VITALS:  /73   Pulse 89   Temp 97.4 °F (36.3 °C) (Temporal)   Resp 16   Wt 145 lb (65.8 kg)   SpO2 98%   BMI 21.41 kg/m²     General: alert and oriented to person, place and time, well-developed and well-nourished, in no acute distress    MUSCULOSKELETAL:   left upper extremity:  · Dressing C/D/I  · Swelling minimal, able to almost completely flex hand  · Compartments soft and compressible  · +AIN/PIN/Ulnar/Median/Radial nerve function intact grossly  · +2/4 Radial pulse, Cap refill <2 sec  · Sensation intact to touch in radial/ulnar/median nerve distributions to hand    CBC:   Lab Results   Component Value Date    WBC 14.1 08/11/2019    HGB 13.5 08/11/2019    HCT 42.5 08/11/2019     08/11/2019     PT/INR:  No results found for: PROTIME, INR      ASSESSMENT  · S/P bedside I&D left middle finger      PLAN      · Packing pulled today  · Awaiting wound culture  · Abx per ID  · Elevate hand  · Pain Control: IV and PO  · Monitor H&H  · Okay to discharge from an orthopedic standpoint pending final ID recs, follow up  · Discuss with attending

## 2019-08-12 NOTE — PROGRESS NOTES
ID consult called to answering service. Awaiting call on who will take patient. Dr. Maggie Lee will be seeing patient.

## 2019-08-13 ENCOUNTER — TELEPHONE (OUTPATIENT)
Dept: ORTHOPEDIC SURGERY | Age: 64
End: 2019-08-13

## 2019-08-13 VITALS
SYSTOLIC BLOOD PRESSURE: 131 MMHG | WEIGHT: 145 LBS | HEART RATE: 83 BPM | BODY MASS INDEX: 21.41 KG/M2 | TEMPERATURE: 97.8 F | RESPIRATION RATE: 16 BRPM | OXYGEN SATURATION: 96 % | DIASTOLIC BLOOD PRESSURE: 77 MMHG

## 2019-08-13 LAB
ORGANISM: ABNORMAL
WOUND/ABSCESS: ABNORMAL

## 2019-08-13 PROCEDURE — 6370000000 HC RX 637 (ALT 250 FOR IP): Performed by: INTERNAL MEDICINE

## 2019-08-13 PROCEDURE — 2580000003 HC RX 258: Performed by: INTERNAL MEDICINE

## 2019-08-13 PROCEDURE — 6360000002 HC RX W HCPCS: Performed by: INTERNAL MEDICINE

## 2019-08-13 RX ORDER — DOXYCYCLINE HYCLATE 100 MG/1
100 CAPSULE ORAL EVERY 12 HOURS SCHEDULED
Status: DISCONTINUED | OUTPATIENT
Start: 2019-08-13 | End: 2019-08-13 | Stop reason: HOSPADM

## 2019-08-13 RX ORDER — DOXYCYCLINE HYCLATE 100 MG/1
100 CAPSULE ORAL EVERY 12 HOURS SCHEDULED
Qty: 20 CAPSULE | Refills: 0 | Status: SHIPPED | OUTPATIENT
Start: 2019-08-13 | End: 2019-08-23

## 2019-08-13 RX ADMIN — HYDROCHLOROTHIAZIDE 25 MG: 25 TABLET ORAL at 08:10

## 2019-08-13 RX ADMIN — SODIUM CHLORIDE: 9 INJECTION, SOLUTION INTRAVENOUS at 05:02

## 2019-08-13 RX ADMIN — ENOXAPARIN SODIUM 40 MG: 40 INJECTION SUBCUTANEOUS at 08:11

## 2019-08-13 RX ADMIN — VANCOMYCIN HYDROCHLORIDE 1000 MG: 1 INJECTION, POWDER, LYOPHILIZED, FOR SOLUTION INTRAVENOUS at 08:09

## 2019-08-13 NOTE — PROGRESS NOTES
Department of Orthopedic Surgery  Resident Progress Note    Patient seen and examined. Pain well controlled. No new complaints. States that he feels like the swelling has decreased. Able to flex and extend finger. Denies chest pain, shortness of breath, dizziness/lightheadedness.     VITALS:  /73   Pulse 79   Temp 97.6 °F (36.4 °C) (Temporal)   Resp 16   Wt 145 lb (65.8 kg)   SpO2 97%   BMI 21.41 kg/m²     General: alert and oriented to person, place and time, well-developed and well-nourished, in no acute distress    MUSCULOSKELETAL:   left upper extremity:  · Dressing C/D/I  · Swelling minimal, able to almost completely flex hand  · Compartments soft and compressible  · +AIN/PIN/Ulnar/Median/Radial nerve function intact grossly  · +2/4 Radial pulse, Cap refill <2 sec  · Sensation intact to touch in radial/ulnar/median nerve distributions to hand    CBC:   Lab Results   Component Value Date    WBC 14.1 08/11/2019    HGB 13.5 08/11/2019    HCT 42.5 08/11/2019     08/11/2019     PT/INR:  No results found for: PROTIME, INR      ASSESSMENT  · S/P bedside I&D left middle finger      PLAN      · Packing pulled today  · Awaiting wound culture  · Abx per ID  · Elevate hand  · Pain Control: IV and PO  · Monitor H&H  · Okay to discharge from an orthopedic standpoint pending final ID recs, follow up  · Discuss with attending

## 2019-08-15 LAB — BLOOD CULTURE, ROUTINE: NORMAL

## 2019-08-16 LAB
BLOOD CULTURE, ROUTINE: NORMAL
CULTURE, BLOOD 2: NORMAL

## 2019-08-20 ENCOUNTER — TELEPHONE (OUTPATIENT)
Dept: ORTHOPEDIC SURGERY | Age: 64
End: 2019-08-20

## 2019-08-20 NOTE — TELEPHONE ENCOUNTER
Patient needs to be seen in the office prior to releasing back to work.  Electronically signed by Christiano Lacey PA-C on 8/20/2019 at 5:11 PM

## 2019-08-23 ENCOUNTER — HOSPITAL ENCOUNTER (OUTPATIENT)
Age: 64
Discharge: HOME | End: 2019-08-23
Payer: COMMERCIAL

## 2019-08-23 LAB
EKG ATRIAL RATE: 75 BPM
EKG P AXIS: 73 DEGREES
EKG P-R INTERVAL: 144 MS
EKG Q-T INTERVAL: 392 MS
EKG QRS DURATION: 94 MS
EKG QTC CALCULATION (BAZETT): 437 MS
EKG R AXIS: 66 DEGREES
EKG T AXIS: 56 DEGREES
EKG VENTRICULAR RATE: 75 BPM

## 2019-08-23 PROCEDURE — 93005 ELECTROCARDIOGRAM TRACING: CPT | Performed by: FAMILY MEDICINE

## 2019-08-23 PROCEDURE — 93010 ELECTROCARDIOGRAM REPORT: CPT | Performed by: INTERNAL MEDICINE

## 2019-08-29 ENCOUNTER — OFFICE VISIT (OUTPATIENT)
Dept: ORTHOPEDIC SURGERY | Age: 64
End: 2019-08-29
Payer: COMMERCIAL

## 2019-08-29 VITALS
TEMPERATURE: 97.8 F | SYSTOLIC BLOOD PRESSURE: 127 MMHG | BODY MASS INDEX: 20.73 KG/M2 | RESPIRATION RATE: 18 BRPM | WEIGHT: 140 LBS | HEIGHT: 69 IN | HEART RATE: 86 BPM | DIASTOLIC BLOOD PRESSURE: 80 MMHG

## 2019-08-29 DIAGNOSIS — L02.512 ABSCESS OF LEFT MIDDLE FINGER: ICD-10-CM

## 2019-08-29 PROCEDURE — 99212 OFFICE O/P EST SF 10 MIN: CPT | Performed by: NURSE PRACTITIONER

## 2019-08-29 NOTE — LETTER
[address]  [address]  [address] [facility] [address]  Phone: [phone]  Fax: [phone]    BARBARA Combs CNP        August 29, 2019     Patient: Deronda Cranker   YOB: 1955   Date of Visit: 8/29/2019       To Whom It May Concern: It is my medical opinion that Deronda Cranker may return to full duty immediately with no restrictions. Patient to keep the left middle finger covered and dry. If you have any questions or concerns, please don't hesitate to call.     Sincerely,        BARBARA Combs CNP

## 2019-08-29 NOTE — PROGRESS NOTES
pain.    XR: 8/29/19 None obtained today    /80   Pulse 86   Temp 97.8 °F (36.6 °C)   Resp 18   Ht 5' 9\" (1.753 m)   Wt 140 lb (63.5 kg)   BMI 20.67 kg/m²     ASSESSMENT:     Diagnosis Orders   1. Abscess of left middle finger         PLAN:  Letter provided that he was seen today. Letter also provided to return to work full duty with no restrictions. Keep the finger covered while at work. Wear gloves. Call if any signs of infection. Such as fevers or chills, swelling or redness. Over-the-counter analgesics as needed for pain control. We will follow up as needed. Electronically signed by BARBARA Ohara CNP on 9/10/2019 at 8:11 AM    Note: This report was completed using [employer] voiced recognition software.  Every effort has been made to ensure accuracy; however, inadvertent computerized transcription errors may be present.

## 2019-09-03 ENCOUNTER — HOSPITAL ENCOUNTER (OUTPATIENT)
Age: 64
Discharge: HOME | End: 2019-09-05

## 2019-09-03 LAB
ANION GAP SERPL CALCULATED.3IONS-SCNC: 12 MMOL/L (ref 7–16)
BUN BLDV-MCNC: 17 MG/DL (ref 8–23)
CALCIUM SERPL-MCNC: 9.7 MG/DL (ref 8.6–10.2)
CHLORIDE BLD-SCNC: 98 MMOL/L (ref 98–107)
CO2: 27 MMOL/L (ref 22–29)
CREAT SERPL-MCNC: 1.1 MG/DL (ref 0.7–1.2)
GFR AFRICAN AMERICAN: >60
GFR NON-AFRICAN AMERICAN: >60 ML/MIN/1.73
GLUCOSE BLD-MCNC: 90 MG/DL (ref 74–99)
HCT VFR BLD CALC: 43.2 % (ref 37–54)
HEMOGLOBIN: 13.7 G/DL (ref 12.5–16.5)
MCH RBC QN AUTO: 24.4 PG (ref 26–35)
MCHC RBC AUTO-ENTMCNC: 31.7 % (ref 32–34.5)
MCV RBC AUTO: 76.9 FL (ref 80–99.9)
PDW BLD-RTO: 16.6 FL (ref 11.5–15)
PLATELET # BLD: 288 E9/L (ref 130–450)
PMV BLD AUTO: 10.2 FL (ref 7–12)
POTASSIUM SERPL-SCNC: 3.9 MMOL/L (ref 3.5–5)
RBC # BLD: 5.62 E12/L (ref 3.8–5.8)
SODIUM BLD-SCNC: 137 MMOL/L (ref 132–146)
WBC # BLD: 4.5 E9/L (ref 4.5–11.5)

## 2019-09-03 PROCEDURE — 87088 URINE BACTERIA CULTURE: CPT

## 2019-09-03 PROCEDURE — 85027 COMPLETE CBC AUTOMATED: CPT

## 2019-09-03 PROCEDURE — 87081 CULTURE SCREEN ONLY: CPT

## 2019-09-03 PROCEDURE — 80048 BASIC METABOLIC PNL TOTAL CA: CPT

## 2019-09-05 LAB
MRSA CULTURE ONLY: NORMAL
URINE CULTURE, ROUTINE: NORMAL

## 2019-09-06 ENCOUNTER — HOSPITAL ENCOUNTER (OUTPATIENT)
Age: 64
Discharge: HOME | End: 2019-09-06
Payer: COMMERCIAL

## 2019-09-06 LAB
BASOPHILS ABSOLUTE: 0.03 E9/L (ref 0–0.2)
BASOPHILS RELATIVE PERCENT: 0.6 % (ref 0–2)
C-REACTIVE PROTEIN: 0.1 MG/DL (ref 0–0.4)
EOSINOPHILS ABSOLUTE: 0.16 E9/L (ref 0.05–0.5)
EOSINOPHILS RELATIVE PERCENT: 3.1 % (ref 0–6)
HCT VFR BLD CALC: 43.7 % (ref 37–54)
HEMOGLOBIN: 13.9 G/DL (ref 12.5–16.5)
IMMATURE GRANULOCYTES #: 0.01 E9/L
IMMATURE GRANULOCYTES %: 0.2 % (ref 0–5)
LYMPHOCYTES ABSOLUTE: 2.54 E9/L (ref 1.5–4)
LYMPHOCYTES RELATIVE PERCENT: 48.8 % (ref 20–42)
MCH RBC QN AUTO: 24.2 PG (ref 26–35)
MCHC RBC AUTO-ENTMCNC: 31.8 % (ref 32–34.5)
MCV RBC AUTO: 76.1 FL (ref 80–99.9)
MONOCYTES ABSOLUTE: 0.47 E9/L (ref 0.1–0.95)
MONOCYTES RELATIVE PERCENT: 9 % (ref 2–12)
NEUTROPHILS ABSOLUTE: 2 E9/L (ref 1.8–7.3)
NEUTROPHILS RELATIVE PERCENT: 38.3 % (ref 43–80)
PDW BLD-RTO: 16.8 FL (ref 11.5–15)
PLATELET # BLD: 255 E9/L (ref 130–450)
PMV BLD AUTO: 9.4 FL (ref 7–12)
RBC # BLD: 5.74 E12/L (ref 3.8–5.8)
SEDIMENTATION RATE, ERYTHROCYTE: 0 MM/HR (ref 0–15)
WBC # BLD: 5.2 E9/L (ref 4.5–11.5)

## 2019-09-06 PROCEDURE — 86140 C-REACTIVE PROTEIN: CPT

## 2019-09-06 PROCEDURE — 36415 COLL VENOUS BLD VENIPUNCTURE: CPT

## 2019-09-06 PROCEDURE — 85651 RBC SED RATE NONAUTOMATED: CPT

## 2019-09-06 PROCEDURE — 85025 COMPLETE CBC W/AUTO DIFF WBC: CPT

## 2019-09-16 ENCOUNTER — HOSPITAL ENCOUNTER (OUTPATIENT)
Age: 64
Discharge: HOME | End: 2019-09-18

## 2019-09-16 LAB
ABO/RH: NORMAL
ANTIBODY SCREEN: NORMAL

## 2019-09-16 PROCEDURE — 86900 BLOOD TYPING SEROLOGIC ABO: CPT

## 2019-09-16 PROCEDURE — 86901 BLOOD TYPING SEROLOGIC RH(D): CPT

## 2019-09-16 PROCEDURE — 86850 RBC ANTIBODY SCREEN: CPT

## 2019-09-17 ENCOUNTER — HOSPITAL ENCOUNTER (OUTPATIENT)
Age: 64
Discharge: HOME | End: 2019-09-19

## 2019-09-17 LAB
ANION GAP SERPL CALCULATED.3IONS-SCNC: 14 MMOL/L (ref 7–16)
BUN BLDV-MCNC: 20 MG/DL (ref 8–23)
CALCIUM SERPL-MCNC: 9.6 MG/DL (ref 8.6–10.2)
CHLORIDE BLD-SCNC: 96 MMOL/L (ref 98–107)
CO2: 28 MMOL/L (ref 22–29)
CREAT SERPL-MCNC: 1 MG/DL (ref 0.7–1.2)
GFR AFRICAN AMERICAN: >60
GFR NON-AFRICAN AMERICAN: >60 ML/MIN/1.73
GLUCOSE BLD-MCNC: 73 MG/DL (ref 74–99)
HCT VFR BLD CALC: 39.2 % (ref 37–54)
HEMOGLOBIN: 11.9 G/DL (ref 12.5–16.5)
MCH RBC QN AUTO: 24.4 PG (ref 26–35)
MCHC RBC AUTO-ENTMCNC: 30.4 % (ref 32–34.5)
MCV RBC AUTO: 80.3 FL (ref 80–99.9)
PDW BLD-RTO: 16.8 FL (ref 11.5–15)
PLATELET # BLD: 174 E9/L (ref 130–450)
PMV BLD AUTO: 10.5 FL (ref 7–12)
POTASSIUM SERPL-SCNC: 4.9 MMOL/L (ref 3.5–5)
RBC # BLD: 4.88 E12/L (ref 3.8–5.8)
SODIUM BLD-SCNC: 138 MMOL/L (ref 132–146)
WBC # BLD: 11.5 E9/L (ref 4.5–11.5)

## 2019-09-17 PROCEDURE — 85027 COMPLETE CBC AUTOMATED: CPT

## 2019-09-17 PROCEDURE — 80048 BASIC METABOLIC PNL TOTAL CA: CPT

## 2019-09-18 ENCOUNTER — HOSPITAL ENCOUNTER (OUTPATIENT)
Age: 64
Discharge: HOME | End: 2019-09-20

## 2019-09-18 LAB
ANION GAP SERPL CALCULATED.3IONS-SCNC: 9 MMOL/L (ref 7–16)
BUN BLDV-MCNC: 16 MG/DL (ref 8–23)
CALCIUM SERPL-MCNC: 8.4 MG/DL (ref 8.6–10.2)
CHLORIDE BLD-SCNC: 100 MMOL/L (ref 98–107)
CO2: 26 MMOL/L (ref 22–29)
CREAT SERPL-MCNC: 1 MG/DL (ref 0.7–1.2)
GFR AFRICAN AMERICAN: >60
GFR NON-AFRICAN AMERICAN: >60 ML/MIN/1.73
GLUCOSE BLD-MCNC: 85 MG/DL (ref 74–99)
HCT VFR BLD CALC: 31.8 % (ref 37–54)
HEMOGLOBIN: 10.1 G/DL (ref 12.5–16.5)
MCH RBC QN AUTO: 24.3 PG (ref 26–35)
MCHC RBC AUTO-ENTMCNC: 31.8 % (ref 32–34.5)
MCV RBC AUTO: 76.6 FL (ref 80–99.9)
PDW BLD-RTO: 15.7 FL (ref 11.5–15)
PLATELET # BLD: 140 E9/L (ref 130–450)
PMV BLD AUTO: 10.4 FL (ref 7–12)
POTASSIUM SERPL-SCNC: 3.8 MMOL/L (ref 3.5–5)
RBC # BLD: 4.15 E12/L (ref 3.8–5.8)
SODIUM BLD-SCNC: 135 MMOL/L (ref 132–146)
WBC # BLD: 7.9 E9/L (ref 4.5–11.5)

## 2019-09-18 PROCEDURE — 85027 COMPLETE CBC AUTOMATED: CPT

## 2019-09-18 PROCEDURE — 80048 BASIC METABOLIC PNL TOTAL CA: CPT

## 2020-01-02 ENCOUNTER — HOSPITAL ENCOUNTER (EMERGENCY)
Age: 65
Discharge: HOME | End: 2020-01-02
Payer: COMMERCIAL

## 2020-01-02 VITALS
WEIGHT: 145 LBS | SYSTOLIC BLOOD PRESSURE: 176 MMHG | RESPIRATION RATE: 16 BRPM | HEIGHT: 69 IN | DIASTOLIC BLOOD PRESSURE: 94 MMHG | BODY MASS INDEX: 21.48 KG/M2 | TEMPERATURE: 98 F | HEART RATE: 100 BPM | OXYGEN SATURATION: 100 %

## 2020-01-02 PROCEDURE — 99282 EMERGENCY DEPT VISIT SF MDM: CPT

## 2020-01-02 RX ORDER — DOXYCYCLINE HYCLATE 100 MG
100 TABLET ORAL 2 TIMES DAILY
Qty: 20 TABLET | Refills: 0 | Status: SHIPPED | OUTPATIENT
Start: 2020-01-02 | End: 2020-01-12

## 2020-01-02 NOTE — ED PROVIDER NOTES
[facility]     Department of Emergency Medicine   ED  Provider Note  Admit Date/RoomTime: 1/2/2020 12:34 PM  ED Room: [address] [place] [address]  Chief Complaint   Abscess (under his neck area for the past 3 days no acitve drainage upon arrival denies trouble swallowing )    History of Present Illness   Source of history provided by:  patient. History/Exam Limitations: none. Francine Mcallister is a 75 y.o. old male who has a past medical history of: History reviewed. No pertinent past medical history. presents to the emergency department by private vehicle, for tender area on anterior neck, which occured several day(s) prior to arrival. He reports it started out as a pimple. Since onset the symptoms have been stable, associated with spontaneous drainage and mild in severity. He has a history of no prior episodes. He denies fever, chills or difficulty breathing or swallowing. ROS   Pertinent positives and negatives are stated within HPI, all other systems reviewed and are negative. Past Surgical History:   Procedure Laterality Date    KNEE SURGERY  09/16/2019   Social History:  reports that he has never smoked. He has never used smokeless tobacco. He reports that he does not drink alcohol or use drugs. Family History: family history is not on file. Allergies: Patient has no known allergies. Physical Exam           ED Triage Vitals [01/02/20 1220]   BP Temp Temp Source Pulse Resp SpO2 Height Weight   (!) 176/94 98 °F (36.7 °C) Temporal 100 16 100 % 5' 9\" (1.753 m) 145 lb (65.8 kg)      Oxygen Saturation Interpretation: Normal.    Constitutional:  Alert, development consistent with age. HEENT:  NC/NT. Airway patent. 2cm indurated non-fluctuant abscess to anterior neck with direct surrounding erythema. Neck:  Normal ROM. Supple. Respiratory:  Clear to auscultation and breath sounds equal.  CV:  Regular rate and rhythm, normal heart sounds, without pathological murmurs, ectopy, gallops, or rubs.   GI:  Abdomen Soft, nontender, good bowel sounds. No firm or pulsatile mass. Back:  No costovertebral tenderness. Extremities: No tenderness or edema noted. Integument:  Normal turgor. Warm, dry, without visible rash, unless noted elsewhere. Lymphatics: No lymphangitis or adenopathy noted. Neurological:  Oriented. Motor functions intact. Lab / Imaging Results   (All laboratory and radiology results have been personally reviewed by myself)  Labs:  No results found for this visit on 01/02/20. Imaging: All Radiology results interpreted by Radiologist unless otherwise noted. No orders to display       ED Course / Medical Decision Making   Medications - No data to display       Consult(s):   None    Procedure(s):   none    MDM: Patient was spontaneously draining abscess directly  surrounding erythema to anterior neck, no fluctuance noted on exam, afebrile and well-appearing. Plan will be for warm compresses, Bactroban as well as doxycycline and follow-up with PCP. Educated on signs and symptoms which require emergent evaluation. Counseling: The emergency provider has spoken with the patient and discussed todays results, in addition to providing specific details for the plan of care and counseling regarding the diagnosis and prognosis. Questions are answered at this time and they are agreeable with the plan. Assessment      1. Cellulitis of neck      Plan   Discharge to home  Patient condition is good    New Medications     New Prescriptions    DOXYCYCLINE HYCLATE (VIBRA-TABS) 100 MG TABLET    Take 1 tablet by mouth 2 times daily for 10 days    MUPIROCIN (BACTROBAN) 2 % OINTMENT    Apply 3 times daily to affected area     Electronically signed by BARBARA Mims CNP   DD: 1/2/20  **This report was transcribed using voice recognition software. Every effort was made to ensure accuracy; however, inadvertent computerized transcription errors may be present.   END OF ED PROVIDER NOTE      Alexandra Gonzales

## 2020-06-04 ENCOUNTER — HOSPITAL ENCOUNTER (OUTPATIENT)
Age: 65
Discharge: HOME | End: 2020-06-06
Payer: COMMERCIAL

## 2020-06-04 PROCEDURE — U0003 INFECTIOUS AGENT DETECTION BY NUCLEIC ACID (DNA OR RNA); SEVERE ACUTE RESPIRATORY SYNDROME CORONAVIRUS 2 (SARS-COV-2) (CORONAVIRUS DISEASE [COVID-19]), AMPLIFIED PROBE TECHNIQUE, MAKING USE OF HIGH THROUGHPUT TECHNOLOGIES AS DESCRIBED BY CMS-2020-01-R: HCPCS

## 2020-06-06 LAB
SARS-COV-2: NOT DETECTED
SOURCE: NORMAL

## 2020-06-07 NOTE — H&P
[address] [facility]                       PREOPERATIVE HISTORY AND PHYSICAL    PATIENT NAME: Jovana Che                       :        1965  MED REC NO:   [id number]                            ROOM:  ACCOUNT NO:   [de-identified]                           ADMIT DATE: 2020  PROVIDER:     Marvin Talley MD    CHIEF COMPLAINT:  Soft tissue mass, left neck. HISTORY OF PRESENT ILLNESS:  This is a 69-year-old gentleman who had  noticed a mass in the neck that has been over 20 years. It has gotten  slightly larger. CAT scan and MRI of the neck that was done on  2019 shows a mass 3.5 x 1.5 overlying the left anterior aspect of  sternocleidomastoid muscle consistent with a lipoma. The patient states  it is increasing slightly. Recommendation at this point in time was  removal.  The patient was told of all risks, benefits, and alternatives,  and has agreed to proceed. PAST SURGICAL HISTORY:  Previous surgeries noted by patient, left knee  replacement in 2019, Dr. Anastasia Espinosa. ALLERGIES:  The patient has no allergies. MEDICATIONS:  The patient is on hydrochlorothiazide for high blood  pressure. REVIEW OF SYSTEMS:  Positive for high blood pressure, no cardiopulmonary  problems otherwise. No diabetes. No genitourinary problems. No  bleeding dyscrasias. No heart valve replacements or joint replacements. No emotional disorders. FAMILY HISTORY:  Noncontributory. SOCIAL HISTORY:  No smoking. Coffee:  One per day. No alcohol. No  diet restrictions. PHYSICAL EXAMINATION:  GENERAL:  A 75year-old patient, oriented x3. HEENT:  Within normal limits. NECK:  Soft tissue mass of left neck, mid neck region, just slightly  anterior to sternocleidomastoid, well circumscribed, no lymphadenopathy,  soft in nature. CHEST:  Clear. CARDIAC:  Normal sinus rhythm. ABDOMEN:  Soft and nontender.   No

## 2020-06-08 ENCOUNTER — HOSPITAL ENCOUNTER (OUTPATIENT)
Dept: PREADMISSION TESTING | Age: 65
Discharge: HOME | End: 2020-06-08
Payer: MEDICARE

## 2020-06-08 LAB
ANION GAP SERPL CALCULATED.3IONS-SCNC: 9 MMOL/L (ref 7–16)
BUN BLDV-MCNC: 18 MG/DL (ref 8–23)
CALCIUM SERPL-MCNC: 9.7 MG/DL (ref 8.6–10.2)
CHLORIDE BLD-SCNC: 101 MMOL/L (ref 98–107)
CO2: 29 MMOL/L (ref 22–29)
CREAT SERPL-MCNC: 1.2 MG/DL (ref 0.7–1.2)
EKG ATRIAL RATE: 85 BPM
EKG P AXIS: 74 DEGREES
EKG P-R INTERVAL: 142 MS
EKG Q-T INTERVAL: 382 MS
EKG QRS DURATION: 90 MS
EKG QTC CALCULATION (BAZETT): 454 MS
EKG R AXIS: 55 DEGREES
EKG T AXIS: 54 DEGREES
EKG VENTRICULAR RATE: 85 BPM
GFR AFRICAN AMERICAN: >60
GFR NON-AFRICAN AMERICAN: >60 ML/MIN/1.73
GLUCOSE BLD-MCNC: 91 MG/DL (ref 74–99)
POTASSIUM REFLEX MAGNESIUM: 4.4 MMOL/L (ref 3.5–5)
SODIUM BLD-SCNC: 139 MMOL/L (ref 132–146)

## 2020-06-08 PROCEDURE — 36415 COLL VENOUS BLD VENIPUNCTURE: CPT

## 2020-06-08 PROCEDURE — 93005 ELECTROCARDIOGRAM TRACING: CPT

## 2020-06-08 PROCEDURE — 80048 BASIC METABOLIC PNL TOTAL CA: CPT

## 2020-06-09 ENCOUNTER — ANESTHESIA EVENT (OUTPATIENT)
Dept: OPERATING ROOM | Age: 65
End: 2020-06-09
Payer: MEDICARE

## 2020-06-09 ENCOUNTER — HOSPITAL ENCOUNTER (OUTPATIENT)
Age: 65
Setting detail: OUTPATIENT SURGERY
Discharge: HOME | End: 2020-06-09
Attending: SURGERY | Admitting: SURGERY
Payer: MEDICARE

## 2020-06-09 ENCOUNTER — ANESTHESIA (OUTPATIENT)
Dept: OPERATING ROOM | Age: 65
End: 2020-06-09
Payer: MEDICARE

## 2020-06-09 VITALS
RESPIRATION RATE: 16 BRPM | OXYGEN SATURATION: 96 % | SYSTOLIC BLOOD PRESSURE: 138 MMHG | WEIGHT: 163 LBS | TEMPERATURE: 96.7 F | DIASTOLIC BLOOD PRESSURE: 88 MMHG | HEIGHT: 69 IN | BODY MASS INDEX: 24.14 KG/M2 | HEART RATE: 78 BPM

## 2020-06-09 VITALS — TEMPERATURE: 96.6 F | SYSTOLIC BLOOD PRESSURE: 110 MMHG | OXYGEN SATURATION: 100 % | DIASTOLIC BLOOD PRESSURE: 65 MMHG

## 2020-06-09 PROCEDURE — 3700000000 HC ANESTHESIA ATTENDED CARE: Performed by: SURGERY

## 2020-06-09 PROCEDURE — 2709999900 HC NON-CHARGEABLE SUPPLY: Performed by: SURGERY

## 2020-06-09 PROCEDURE — 7100000010 HC PHASE II RECOVERY - FIRST 15 MIN: Performed by: SURGERY

## 2020-06-09 PROCEDURE — 2500000003 HC RX 250 WO HCPCS

## 2020-06-09 PROCEDURE — 88304 TISSUE EXAM BY PATHOLOGIST: CPT

## 2020-06-09 PROCEDURE — 3600000012 HC SURGERY LEVEL 2 ADDTL 15MIN: Performed by: SURGERY

## 2020-06-09 PROCEDURE — 6360000002 HC RX W HCPCS

## 2020-06-09 PROCEDURE — 7100000011 HC PHASE II RECOVERY - ADDTL 15 MIN: Performed by: SURGERY

## 2020-06-09 PROCEDURE — 3700000001 HC ADD 15 MINUTES (ANESTHESIA): Performed by: SURGERY

## 2020-06-09 PROCEDURE — 7100000000 HC PACU RECOVERY - FIRST 15 MIN: Performed by: SURGERY

## 2020-06-09 PROCEDURE — 2580000003 HC RX 258: Performed by: SURGERY

## 2020-06-09 PROCEDURE — 3600000002 HC SURGERY LEVEL 2 BASE: Performed by: SURGERY

## 2020-06-09 PROCEDURE — 7100000001 HC PACU RECOVERY - ADDTL 15 MIN: Performed by: SURGERY

## 2020-06-09 RX ORDER — PROMETHAZINE HYDROCHLORIDE 25 MG/ML
6.25 INJECTION, SOLUTION INTRAMUSCULAR; INTRAVENOUS
Status: DISCONTINUED | OUTPATIENT
Start: 2020-06-09 | End: 2020-06-09 | Stop reason: HOSPADM

## 2020-06-09 RX ORDER — SODIUM CHLORIDE 0.9 % (FLUSH) 0.9 %
10 SYRINGE (ML) INJECTION EVERY 12 HOURS SCHEDULED
Status: DISCONTINUED | OUTPATIENT
Start: 2020-06-09 | End: 2020-06-09 | Stop reason: HOSPADM

## 2020-06-09 RX ORDER — LABETALOL HYDROCHLORIDE 5 MG/ML
5 INJECTION, SOLUTION INTRAVENOUS EVERY 10 MIN PRN
Status: DISCONTINUED | OUTPATIENT
Start: 2020-06-09 | End: 2020-06-09 | Stop reason: HOSPADM

## 2020-06-09 RX ORDER — ONDANSETRON 2 MG/ML
INJECTION INTRAMUSCULAR; INTRAVENOUS PRN
Status: DISCONTINUED | OUTPATIENT
Start: 2020-06-09 | End: 2020-06-09 | Stop reason: SDUPTHER

## 2020-06-09 RX ORDER — PROPOFOL 10 MG/ML
INJECTION, EMULSION INTRAVENOUS PRN
Status: DISCONTINUED | OUTPATIENT
Start: 2020-06-09 | End: 2020-06-09 | Stop reason: SDUPTHER

## 2020-06-09 RX ORDER — FENTANYL CITRATE 50 UG/ML
INJECTION, SOLUTION INTRAMUSCULAR; INTRAVENOUS PRN
Status: DISCONTINUED | OUTPATIENT
Start: 2020-06-09 | End: 2020-06-09 | Stop reason: SDUPTHER

## 2020-06-09 RX ORDER — OXYCODONE HYDROCHLORIDE AND ACETAMINOPHEN 5; 325 MG/1; MG/1
1 TABLET ORAL
Status: DISCONTINUED | OUTPATIENT
Start: 2020-06-09 | End: 2020-06-09 | Stop reason: HOSPADM

## 2020-06-09 RX ORDER — GLYCOPYRROLATE 1 MG/5 ML
SYRINGE (ML) INTRAVENOUS PRN
Status: DISCONTINUED | OUTPATIENT
Start: 2020-06-09 | End: 2020-06-09 | Stop reason: SDUPTHER

## 2020-06-09 RX ORDER — PHENYLEPHRINE HYDROCHLORIDE 10 MG/ML
INJECTION INTRAVENOUS PRN
Status: DISCONTINUED | OUTPATIENT
Start: 2020-06-09 | End: 2020-06-09 | Stop reason: SDUPTHER

## 2020-06-09 RX ORDER — MIDAZOLAM HYDROCHLORIDE 1 MG/ML
INJECTION INTRAMUSCULAR; INTRAVENOUS PRN
Status: DISCONTINUED | OUTPATIENT
Start: 2020-06-09 | End: 2020-06-09 | Stop reason: SDUPTHER

## 2020-06-09 RX ORDER — LIDOCAINE HYDROCHLORIDE 20 MG/ML
INJECTION, SOLUTION EPIDURAL; INFILTRATION; INTRACAUDAL; PERINEURAL PRN
Status: DISCONTINUED | OUTPATIENT
Start: 2020-06-09 | End: 2020-06-09 | Stop reason: SDUPTHER

## 2020-06-09 RX ORDER — ROCURONIUM BROMIDE 10 MG/ML
INJECTION, SOLUTION INTRAVENOUS PRN
Status: DISCONTINUED | OUTPATIENT
Start: 2020-06-09 | End: 2020-06-09 | Stop reason: SDUPTHER

## 2020-06-09 RX ORDER — SODIUM CHLORIDE 9 MG/ML
INJECTION, SOLUTION INTRAVENOUS CONTINUOUS PRN
Status: DISCONTINUED | OUTPATIENT
Start: 2020-06-09 | End: 2020-06-09

## 2020-06-09 RX ORDER — HYDRALAZINE HYDROCHLORIDE 20 MG/ML
5 INJECTION INTRAMUSCULAR; INTRAVENOUS EVERY 10 MIN PRN
Status: DISCONTINUED | OUTPATIENT
Start: 2020-06-09 | End: 2020-06-09 | Stop reason: HOSPADM

## 2020-06-09 RX ORDER — DEXAMETHASONE SODIUM PHOSPHATE 4 MG/ML
INJECTION, SOLUTION INTRA-ARTICULAR; INTRALESIONAL; INTRAMUSCULAR; INTRAVENOUS; SOFT TISSUE PRN
Status: DISCONTINUED | OUTPATIENT
Start: 2020-06-09 | End: 2020-06-09 | Stop reason: SDUPTHER

## 2020-06-09 RX ORDER — SODIUM CHLORIDE, SODIUM LACTATE, POTASSIUM CHLORIDE, CALCIUM CHLORIDE 600; 310; 30; 20 MG/100ML; MG/100ML; MG/100ML; MG/100ML
INJECTION, SOLUTION INTRAVENOUS CONTINUOUS
Status: DISCONTINUED | OUTPATIENT
Start: 2020-06-09 | End: 2020-06-09 | Stop reason: HOSPADM

## 2020-06-09 RX ORDER — HYDROCODONE BITARTRATE AND ACETAMINOPHEN 5; 325 MG/1; MG/1
1 TABLET ORAL EVERY 6 HOURS PRN
Qty: 28 TABLET | Refills: 0 | Status: SHIPPED | OUTPATIENT
Start: 2020-06-09 | End: 2020-06-09 | Stop reason: SDUPTHER

## 2020-06-09 RX ORDER — SUCCINYLCHOLINE/SOD CL,ISO/PF 200MG/10ML
SYRINGE (ML) INTRAVENOUS PRN
Status: DISCONTINUED | OUTPATIENT
Start: 2020-06-09 | End: 2020-06-09 | Stop reason: SDUPTHER

## 2020-06-09 RX ORDER — MEPERIDINE HYDROCHLORIDE 25 MG/ML
12.5 INJECTION INTRAMUSCULAR; INTRAVENOUS; SUBCUTANEOUS EVERY 5 MIN PRN
Status: DISCONTINUED | OUTPATIENT
Start: 2020-06-09 | End: 2020-06-09 | Stop reason: HOSPADM

## 2020-06-09 RX ORDER — HYDROCODONE BITARTRATE AND ACETAMINOPHEN 5; 325 MG/1; MG/1
1 TABLET ORAL EVERY 6 HOURS PRN
Qty: 28 TABLET | Refills: 0 | Status: SHIPPED | OUTPATIENT
Start: 2020-06-09 | End: 2020-06-16

## 2020-06-09 RX ORDER — NEOSTIGMINE METHYLSULFATE 1 MG/ML
INJECTION, SOLUTION INTRAVENOUS PRN
Status: DISCONTINUED | OUTPATIENT
Start: 2020-06-09 | End: 2020-06-09 | Stop reason: SDUPTHER

## 2020-06-09 RX ADMIN — PROPOFOL 100 MG: 10 INJECTION, EMULSION INTRAVENOUS at 07:20

## 2020-06-09 RX ADMIN — PHENYLEPHRINE HYDROCHLORIDE 100 MCG: 10 INJECTION INTRAVENOUS at 07:23

## 2020-06-09 RX ADMIN — DEXAMETHASONE SODIUM PHOSPHATE 10 MG: 4 INJECTION, SOLUTION INTRAMUSCULAR; INTRAVENOUS at 07:25

## 2020-06-09 RX ADMIN — PHENYLEPHRINE HYDROCHLORIDE 100 MCG: 10 INJECTION INTRAVENOUS at 07:26

## 2020-06-09 RX ADMIN — SODIUM CHLORIDE, POTASSIUM CHLORIDE, SODIUM LACTATE AND CALCIUM CHLORIDE: 600; 310; 30; 20 INJECTION, SOLUTION INTRAVENOUS at 07:09

## 2020-06-09 RX ADMIN — SODIUM CHLORIDE, POTASSIUM CHLORIDE, SODIUM LACTATE AND CALCIUM CHLORIDE: 600; 310; 30; 20 INJECTION, SOLUTION INTRAVENOUS at 07:51

## 2020-06-09 RX ADMIN — Medication 140 MG: at 07:20

## 2020-06-09 RX ADMIN — PHENYLEPHRINE HYDROCHLORIDE 100 MCG: 10 INJECTION INTRAVENOUS at 07:29

## 2020-06-09 RX ADMIN — Medication 0.6 MG: at 07:51

## 2020-06-09 RX ADMIN — LIDOCAINE HYDROCHLORIDE 100 MG: 20 INJECTION, SOLUTION EPIDURAL; INFILTRATION; INTRACAUDAL; PERINEURAL at 07:20

## 2020-06-09 RX ADMIN — Medication 3 MG: at 07:51

## 2020-06-09 RX ADMIN — PHENYLEPHRINE HYDROCHLORIDE 100 MCG: 10 INJECTION INTRAVENOUS at 07:31

## 2020-06-09 RX ADMIN — FENTANYL CITRATE 100 MCG: 50 INJECTION, SOLUTION INTRAMUSCULAR; INTRAVENOUS at 07:18

## 2020-06-09 RX ADMIN — MIDAZOLAM 2 MG: 1 INJECTION INTRAMUSCULAR; INTRAVENOUS at 07:05

## 2020-06-09 RX ADMIN — ROCURONIUM BROMIDE 10 MG: 10 INJECTION, SOLUTION INTRAVENOUS at 07:20

## 2020-06-09 RX ADMIN — ONDANSETRON HYDROCHLORIDE 4 MG: 2 INJECTION, SOLUTION INTRAMUSCULAR; INTRAVENOUS at 07:25

## 2020-06-09 RX ADMIN — Medication 0.2 MG: at 07:35

## 2020-06-09 ASSESSMENT — PULMONARY FUNCTION TESTS
PIF_VALUE: 22
PIF_VALUE: 18
PIF_VALUE: 17
PIF_VALUE: 20
PIF_VALUE: 16
PIF_VALUE: 15
PIF_VALUE: 1
PIF_VALUE: 20
PIF_VALUE: 5
PIF_VALUE: 3
PIF_VALUE: 22
PIF_VALUE: 20
PIF_VALUE: 18
PIF_VALUE: 19
PIF_VALUE: 18
PIF_VALUE: 1
PIF_VALUE: 26
PIF_VALUE: 17
PIF_VALUE: 2
PIF_VALUE: 26
PIF_VALUE: 22
PIF_VALUE: 20
PIF_VALUE: 22
PIF_VALUE: 24
PIF_VALUE: 21
PIF_VALUE: 17
PIF_VALUE: 18
PIF_VALUE: 22
PIF_VALUE: 20
PIF_VALUE: 18
PIF_VALUE: 10
PIF_VALUE: 6
PIF_VALUE: 22
PIF_VALUE: 18
PIF_VALUE: 20
PIF_VALUE: 19
PIF_VALUE: 18
PIF_VALUE: 22
PIF_VALUE: 18
PIF_VALUE: 3
PIF_VALUE: 22
PIF_VALUE: 22
PIF_VALUE: 3
PIF_VALUE: 20
PIF_VALUE: 17
PIF_VALUE: 22
PIF_VALUE: 17
PIF_VALUE: 3
PIF_VALUE: 1
PIF_VALUE: 22
PIF_VALUE: 20
PIF_VALUE: 19
PIF_VALUE: 22

## 2020-06-09 ASSESSMENT — PAIN SCALES - GENERAL
PAINLEVEL_OUTOF10: 0

## 2020-06-09 ASSESSMENT — PAIN - FUNCTIONAL ASSESSMENT
PAIN_FUNCTIONAL_ASSESSMENT: ACTIVITIES ARE NOT PREVENTED
PAIN_FUNCTIONAL_ASSESSMENT: 0-10

## 2020-06-09 NOTE — ANESTHESIA POSTPROCEDURE EVALUATION
Department of Anesthesiology  Postprocedure Note    Patient: Ella Villatoro  MRN: [id number]  [place]: 1955  Date of evaluation: 6/9/2020  Time:  9:13 AM     Procedure Summary     Date:  06/09/20 Room / Location:  [place] [address] / [address] [place] [address]    Anesthesia Start:  1532 Anesthesia Stop:  4408    Procedure:  EXCISION LEFT NECK MASS (Left Neck) Diagnosis:  (LEFT NECK SOFT TISSUE MASS)    Surgeon:  Cristina Mohr MD Responsible Provider:  Didi Overton MD    Anesthesia Type:  general ASA Status:  2          Anesthesia Type: general    Keri Phase I: Keri Score: 10    Keri Phase II: Keri Score: 10    Last vitals: Reviewed and per EMR flowsheets.        Anesthesia Post Evaluation    Patient location during evaluation: PACU  Patient participation: complete - patient participated  Level of consciousness: awake and alert  Airway patency: patent  Nausea & Vomiting: no nausea and no vomiting  Complications: no  Cardiovascular status: hemodynamically stable  Respiratory status: acceptable  Hydration status: euvolemic

## 2020-06-09 NOTE — ANESTHESIA PRE PROCEDURE
Department of Anesthesiology  Preprocedure Note       Name:  Tay Titus   Age:  77 y.o.  :  1955                                          MRN:  [id number]         Date:  2020      Surgeon: Chan Isabel):  Dank Urrutia MD    Procedure: Procedure(s):  EXCISION LEFT NECK MASS    Medications prior to admission:   Prior to Admission medications    Medication Sig Start Date End Date Taking? Authorizing Provider   ALBUTEROL IN Inhale into the lungs as needed   Yes Historical Provider, MD   hydrochlorothiazide (HYDRODIURIL) 25 MG tablet Take 25 mg by mouth daily   Yes Historical Provider, MD   Multiple Vitamins-Minerals (THERAPEUTIC MULTIVITAMIN-MINERALS) tablet Take 1 tablet by mouth daily   Yes Historical Provider, MD   Cholecalciferol (VITAMIN D3) 40579 units CAPS Take 50,000 Units by mouth once a week Takes on    Yes Historical Provider, MD   b complex vitamins capsule Take 1 capsule by mouth daily   Yes Historical Provider, MD       Current medications:    Current Facility-Administered Medications   Medication Dose Route Frequency Provider Last Rate Last Dose    sodium chloride flush 0.9 % injection 10 mL  10 mL Intravenous 2 times per day Dank Urrutia MD        lactated ringers infusion   Intravenous Continuous Dank Urrutia MD           Allergies:  No Known Allergies    Problem List:    Patient Active Problem List   Diagnosis Code    Abscess of left middle finger L02.512    Essential hypertension I10       Past Medical History:        Diagnosis Date    Asthma     Hypertension     Neck mass        Past Surgical History:        Procedure Laterality Date    COLONOSCOPY  2012    KNEE SURGERY  2019       Social History:    Social History     Tobacco Use    Smoking status: Never Smoker    Smokeless tobacco: Never Used   Substance Use Topics    Alcohol use:  No                                Counseling given: Not Answered      Vital Signs (Current):   Vitals:    20 1349 20 0625   BP:  (!) 162/82   Pulse:  88   Resp:  16   Temp:  97.3 °F (36.3 °C)   TempSrc:  Temporal   SpO2:  97%   Weight: 163 lb (73.9 kg)    Height: 5' 9\" (1.753 m)                                               BP Readings from Last 3 Encounters:   06/09/20 (!) 162/82   01/02/20 (!) 176/94   08/29/19 127/80       NPO Status: Time of last liquid consumption: 2200                        Time of last solid consumption: 2200                        Date of last liquid consumption: 06/08/20                        Date of last solid food consumption: 06/08/20    BMI:   Wt Readings from Last 3 Encounters:   06/05/20 163 lb (73.9 kg)   01/02/20 145 lb (65.8 kg)   08/29/19 140 lb (63.5 kg)     Body mass index is 24.07 kg/m². CBC:   Lab Results   Component Value Date    WBC 7.9 09/18/2019    RBC 4.15 09/18/2019    HGB 10.1 09/18/2019    HCT 31.8 09/18/2019    MCV 76.6 09/18/2019    RDW 15.7 09/18/2019     09/18/2019       CMP:   Lab Results   Component Value Date     06/08/2020    K 4.4 06/08/2020     06/08/2020    CO2 29 06/08/2020    BUN 18 06/08/2020    CREATININE 1.2 06/08/2020    GFRAA >60 06/08/2020    LABGLOM >60 06/08/2020    GLUCOSE 91 06/08/2020    GLUCOSE 88 07/01/2011    PROT 8.1 08/10/2019    CALCIUM 9.7 06/08/2020    BILITOT 0.4 08/10/2019    ALKPHOS 77 08/10/2019    AST 19 08/10/2019    ALT 16 08/10/2019       POC Tests: No results for input(s): POCGLU, POCNA, POCK, POCCL, POCBUN, POCHEMO, POCHCT in the last 72 hours.     Coags: No results found for: PROTIME, INR, APTT    HCG (If Applicable): No results found for: PREGTESTUR, PREGSERUM, HCG, HCGQUANT     ABGs: No results found for: PHART, PO2ART, GFX1OTK, FVN5MVY, BEART, G9BOWLWR     Type & Screen (If Applicable):  No results found for: LABABO, LABRH    Drug/Infectious Status (If Applicable):  No results found for: HIV, HEPCAB    COVID-19 Screening (If Applicable):   Lab Results   Component Value Date    COVID19 Not Detected 06/04/2020 Anesthesia Evaluation  Patient summary reviewed no history of anesthetic complications:   Airway: Mallampati: II  TM distance: >3 FB   Neck ROM: full  Mouth opening: > = 3 FB Dental:    (+) edentulous, upper dentures and lower dentures      Pulmonary: breath sounds clear to auscultation  (+) asthma:                           ROS comment: MRI neck 4/22/2019:  1. Mass associated with superficial left neck soft tissue is  consistent with a lipoma.     2. No suspicious mass or lymphadenopathy. Cardiovascular:    (+) hypertension:,       ECG reviewed  Rhythm: regular  Rate: normal                 ROS comment: EKG 6/8/2020:  Normal sinus rhythm  Normal ECG  No previous ECGs available  Confirmed by Chino Steward ([id number]) on 6/8/2020 12:19:30 PM         Neuro/Psych:   Negative Neuro/Psych ROS              GI/Hepatic/Renal: Neg GI/Hepatic/Renal ROS            Endo/Other:    (+) blood dyscrasia: anemia:., .                 Abdominal:           Vascular: negative vascular ROS. Anesthesia Plan      general     ASA 2       Induction: intravenous. MIPS: Postoperative opioids intended, Prophylactic antiemetics administered and Postoperative trial extubation. Anesthetic plan and risks discussed with patient. Use of blood products discussed with patient whom consented to blood products.                  Jennifer Bonilla MD   6/9/2020

## 2020-06-09 NOTE — PROGRESS NOTES
Discharge instructions given to patient.  Verbalized understanding
Pt taking ice chips without difficulty
No                Are you having chills                    No                Are you having muscle aches. No                    Please come to the hospital wearing a mask and have your significant other wear a mask as well. Both of you should check your temperature before leaving to come here,  if it is 100 or higher please call [phone] for instruction.

## 2020-09-24 ENCOUNTER — HOSPITAL ENCOUNTER (OUTPATIENT)
Age: 65
Discharge: HOME | End: 2020-09-24
Payer: MEDICARE

## 2020-09-24 LAB
ALBUMIN SERPL-MCNC: 4.4 G/DL (ref 3.5–5.2)
ALP BLD-CCNC: 71 U/L (ref 40–129)
ALT SERPL-CCNC: 20 U/L (ref 0–40)
ANION GAP SERPL CALCULATED.3IONS-SCNC: 12 MMOL/L (ref 7–16)
AST SERPL-CCNC: 28 U/L (ref 0–39)
BASOPHILS ABSOLUTE: 0.03 E9/L (ref 0–0.2)
BASOPHILS RELATIVE PERCENT: 0.8 % (ref 0–2)
BILIRUB SERPL-MCNC: 0.2 MG/DL (ref 0–1.2)
BUN BLDV-MCNC: 17 MG/DL (ref 8–23)
CALCIUM SERPL-MCNC: 10 MG/DL (ref 8.6–10.2)
CHLORIDE BLD-SCNC: 96 MMOL/L (ref 98–107)
CHOLESTEROL, TOTAL: 126 MG/DL (ref 0–199)
CO2: 28 MMOL/L (ref 22–29)
CREAT SERPL-MCNC: 1.3 MG/DL (ref 0.7–1.2)
EOSINOPHILS ABSOLUTE: 0.07 E9/L (ref 0.05–0.5)
EOSINOPHILS RELATIVE PERCENT: 2 % (ref 0–6)
GFR AFRICAN AMERICAN: >60
GFR NON-AFRICAN AMERICAN: >60 ML/MIN/1.73
GLUCOSE BLD-MCNC: 89 MG/DL (ref 74–99)
HCT VFR BLD CALC: 45.1 % (ref 37–54)
HDLC SERPL-MCNC: 60 MG/DL
HEMOGLOBIN: 14.3 G/DL (ref 12.5–16.5)
IMMATURE GRANULOCYTES #: 0 E9/L
IMMATURE GRANULOCYTES %: 0 % (ref 0–5)
LDL CHOLESTEROL CALCULATED: 55 MG/DL (ref 0–99)
LYMPHOCYTES ABSOLUTE: 1.8 E9/L (ref 1.5–4)
LYMPHOCYTES RELATIVE PERCENT: 50.7 % (ref 20–42)
MCH RBC QN AUTO: 23.1 PG (ref 26–35)
MCHC RBC AUTO-ENTMCNC: 31.7 % (ref 32–34.5)
MCV RBC AUTO: 72.7 FL (ref 80–99.9)
MONOCYTES ABSOLUTE: 0.35 E9/L (ref 0.1–0.95)
MONOCYTES RELATIVE PERCENT: 9.9 % (ref 2–12)
NEUTROPHILS ABSOLUTE: 1.3 E9/L (ref 1.8–7.3)
NEUTROPHILS RELATIVE PERCENT: 36.6 % (ref 43–80)
PDW BLD-RTO: 16.8 FL (ref 11.5–15)
PLATELET # BLD: 245 E9/L (ref 130–450)
PMV BLD AUTO: 10.1 FL (ref 7–12)
POTASSIUM SERPL-SCNC: 3.4 MMOL/L (ref 3.5–5)
PROSTATE SPECIFIC ANTIGEN: 0.8 NG/ML (ref 0–4)
RBC # BLD: 6.2 E12/L (ref 3.8–5.8)
SODIUM BLD-SCNC: 136 MMOL/L (ref 132–146)
TOTAL PROTEIN: 7.7 G/DL (ref 6.4–8.3)
TRIGL SERPL-MCNC: 56 MG/DL (ref 0–149)
VLDLC SERPL CALC-MCNC: 11 MG/DL
WBC # BLD: 3.6 E9/L (ref 4.5–11.5)

## 2020-09-24 PROCEDURE — 36415 COLL VENOUS BLD VENIPUNCTURE: CPT

## 2020-09-24 PROCEDURE — 80053 COMPREHEN METABOLIC PANEL: CPT

## 2020-09-24 PROCEDURE — 84153 ASSAY OF PSA TOTAL: CPT

## 2020-09-24 PROCEDURE — 85025 COMPLETE CBC W/AUTO DIFF WBC: CPT

## 2020-09-24 PROCEDURE — 82652 VIT D 1 25-DIHYDROXY: CPT

## 2020-09-24 PROCEDURE — 80061 LIPID PANEL: CPT

## 2020-09-29 LAB — VITAMIN D 1,25-DIHYDROXY: 97.8 PG/ML (ref 19.9–79.3)

## 2021-09-13 ENCOUNTER — HOSPITAL ENCOUNTER (OUTPATIENT)
Age: 66
Discharge: HOME | End: 2021-09-13
Payer: MEDICARE

## 2021-09-13 LAB
ALBUMIN SERPL-MCNC: 4.5 G/DL (ref 3.5–5.2)
ALP BLD-CCNC: 63 U/L (ref 40–129)
ALT SERPL-CCNC: 19 U/L (ref 0–40)
ANION GAP SERPL CALCULATED.3IONS-SCNC: 13 MMOL/L (ref 7–16)
AST SERPL-CCNC: 31 U/L (ref 0–39)
BASOPHILS ABSOLUTE: 0.02 E9/L (ref 0–0.2)
BASOPHILS RELATIVE PERCENT: 0.4 % (ref 0–2)
BILIRUB SERPL-MCNC: 0.2 MG/DL (ref 0–1.2)
BUN BLDV-MCNC: 20 MG/DL (ref 6–23)
CALCIUM SERPL-MCNC: 9.6 MG/DL (ref 8.6–10.2)
CHLORIDE BLD-SCNC: 100 MMOL/L (ref 98–107)
CHOLESTEROL, TOTAL: 110 MG/DL (ref 0–199)
CO2: 26 MMOL/L (ref 22–29)
CREAT SERPL-MCNC: 1.3 MG/DL (ref 0.7–1.2)
EOSINOPHILS ABSOLUTE: 0.07 E9/L (ref 0.05–0.5)
EOSINOPHILS RELATIVE PERCENT: 1.6 % (ref 0–6)
GFR AFRICAN AMERICAN: >60
GFR NON-AFRICAN AMERICAN: >60 ML/MIN/1.73
GLUCOSE BLD-MCNC: 87 MG/DL (ref 74–99)
HCT VFR BLD CALC: 44 % (ref 37–54)
HDLC SERPL-MCNC: 51 MG/DL
HEMOGLOBIN: 13.8 G/DL (ref 12.5–16.5)
IMMATURE GRANULOCYTES #: 0.01 E9/L
IMMATURE GRANULOCYTES %: 0.2 % (ref 0–5)
LDL CHOLESTEROL CALCULATED: 49 MG/DL (ref 0–99)
LYMPHOCYTES ABSOLUTE: 2.27 E9/L (ref 1.5–4)
LYMPHOCYTES RELATIVE PERCENT: 51 % (ref 20–42)
MCH RBC QN AUTO: 23.3 PG (ref 26–35)
MCHC RBC AUTO-ENTMCNC: 31.4 % (ref 32–34.5)
MCV RBC AUTO: 74.2 FL (ref 80–99.9)
MONOCYTES ABSOLUTE: 0.44 E9/L (ref 0.1–0.95)
MONOCYTES RELATIVE PERCENT: 9.9 % (ref 2–12)
NEUTROPHILS ABSOLUTE: 1.64 E9/L (ref 1.8–7.3)
NEUTROPHILS RELATIVE PERCENT: 36.9 % (ref 43–80)
PDW BLD-RTO: 15.9 FL (ref 11.5–15)
PLATELET # BLD: 214 E9/L (ref 130–450)
PMV BLD AUTO: 10 FL (ref 7–12)
POTASSIUM SERPL-SCNC: 3.8 MMOL/L (ref 3.5–5)
PROSTATE SPECIFIC ANTIGEN: 0.97 NG/ML (ref 0–4)
RBC # BLD: 5.93 E12/L (ref 3.8–5.8)
SODIUM BLD-SCNC: 139 MMOL/L (ref 132–146)
TOTAL PROTEIN: 7.6 G/DL (ref 6.4–8.3)
TRIGL SERPL-MCNC: 51 MG/DL (ref 0–149)
VITAMIN D 25-HYDROXY: 83 NG/ML (ref 30–100)
VLDLC SERPL CALC-MCNC: 10 MG/DL
WBC # BLD: 4.5 E9/L (ref 4.5–11.5)

## 2021-09-13 PROCEDURE — 80053 COMPREHEN METABOLIC PANEL: CPT

## 2021-09-13 PROCEDURE — 85025 COMPLETE CBC W/AUTO DIFF WBC: CPT

## 2021-09-13 PROCEDURE — 82306 VITAMIN D 25 HYDROXY: CPT

## 2021-09-13 PROCEDURE — G0103 PSA SCREENING: HCPCS

## 2021-09-13 PROCEDURE — 36415 COLL VENOUS BLD VENIPUNCTURE: CPT

## 2021-09-13 PROCEDURE — 80061 LIPID PANEL: CPT

## 2022-07-21 ENCOUNTER — TELEPHONE (OUTPATIENT)
Dept: CASE MANAGEMENT | Age: 67
End: 2022-07-21

## 2022-07-21 NOTE — TELEPHONE ENCOUNTER
I called the patient and left a message reminding him of his CT lung screening at [facility] on 7/22/2022 at 7:30 am with an 7:00 am arrival.  If unable to keep this appt call the office at [phone] to get rescheduled.           Electronically signed by Anthony Hall on 7/21/22 at 3:39 PM EDT

## 2022-07-22 ENCOUNTER — HOSPITAL ENCOUNTER (OUTPATIENT)
Dept: CT IMAGING | Age: 67
Discharge: HOME | End: 2022-07-24
Payer: MEDICARE

## 2022-07-22 DIAGNOSIS — Z87.891 PERSONAL HISTORY OF TOBACCO USE, PRESENTING HAZARDS TO HEALTH: ICD-10-CM

## 2022-07-22 DIAGNOSIS — F17.211 CIGARETTE NICOTINE DEPENDENCE IN REMISSION: ICD-10-CM

## 2022-07-22 PROCEDURE — 71271 CT THORAX LUNG CANCER SCR C-: CPT

## 2022-07-25 ENCOUNTER — TELEPHONE (OUTPATIENT)
Dept: CASE MANAGEMENT | Age: 67
End: 2022-07-25

## 2022-07-25 NOTE — TELEPHONE ENCOUNTER
No call, encounter opened to process CT Lung Screening. CT Lung Screen: 7/22/2022    Impression   Moderate emphysematous changes with a 2 mm right lower lobe pulmonary nodule. See below recommendations for cross-sectional imaging follow-up. Partially imaged suspected dilation of the pancreatic duct up to 6 mm. A   pancreatic neoplasm in the pancreatic head cannot be excluded. Correlation   with a contrast-enhanced CT of the abdomen and pelvis is recommended for   further assessment. The findings were sent to the Radiology Results [address] at 8:36   am on 7/23/2022 to be communicated to a licensed caregiver. LUNG RADS:   Per ACR Lung-RADS Version 1.1       Category 2 S, Benign appearance or behavior. Management:  Continue annual lung screening with LDCT in 12 months. Findings:  Modifier-may add on to category 0-4 coding. Management:  As appropriate to specific finding. RECOMMENDATIONS:   If you would like to register your patient with the [facility], please contact the Nurse Navigator at   [phone]. Pack years: 27    Social History     Tobacco Use  Smoking Status: Former Smoker   Start Date:    Quit Date: 1/1/2019   Types: Cigarettes   Packs/Day: 1   Years: 30   Pack Years: 27   Smokeless Tobacco:Never        Results letter sent to patient via my chart or mailed.      [address]

## 2022-07-28 ENCOUNTER — HOSPITAL ENCOUNTER (OUTPATIENT)
Age: 67
Discharge: HOME | End: 2022-07-28
Payer: MEDICARE

## 2022-07-28 LAB
ALBUMIN SERPL-MCNC: 4.5 G/DL (ref 3.5–5.2)
ALP BLD-CCNC: 61 U/L (ref 40–129)
ALT SERPL-CCNC: 20 U/L (ref 0–40)
ANION GAP SERPL CALCULATED.3IONS-SCNC: 11 MMOL/L (ref 7–16)
AST SERPL-CCNC: 31 U/L (ref 0–39)
BASOPHILS ABSOLUTE: 0.03 E9/L (ref 0–0.2)
BASOPHILS RELATIVE PERCENT: 0.8 % (ref 0–2)
BILIRUB SERPL-MCNC: 0.2 MG/DL (ref 0–1.2)
BUN BLDV-MCNC: 20 MG/DL (ref 6–23)
CALCIUM SERPL-MCNC: 9.4 MG/DL (ref 8.6–10.2)
CHLORIDE BLD-SCNC: 103 MMOL/L (ref 98–107)
CHOLESTEROL, TOTAL: 130 MG/DL (ref 0–199)
CO2: 23 MMOL/L (ref 22–29)
CREAT SERPL-MCNC: 1.3 MG/DL (ref 0.7–1.2)
EOSINOPHILS ABSOLUTE: 0.1 E9/L (ref 0.05–0.5)
EOSINOPHILS RELATIVE PERCENT: 2.6 % (ref 0–6)
GFR AFRICAN AMERICAN: >60
GFR NON-AFRICAN AMERICAN: >60 ML/MIN/1.73
GLUCOSE BLD-MCNC: 69 MG/DL (ref 74–99)
HCT VFR BLD CALC: 42.5 % (ref 37–54)
HDLC SERPL-MCNC: 68 MG/DL
HEMOGLOBIN: 13.4 G/DL (ref 12.5–16.5)
IMMATURE GRANULOCYTES #: 0 E9/L
IMMATURE GRANULOCYTES %: 0 % (ref 0–5)
LDL CHOLESTEROL CALCULATED: 53 MG/DL (ref 0–99)
LYMPHOCYTES ABSOLUTE: 1.85 E9/L (ref 1.5–4)
LYMPHOCYTES RELATIVE PERCENT: 48.6 % (ref 20–42)
MCH RBC QN AUTO: 23.3 PG (ref 26–35)
MCHC RBC AUTO-ENTMCNC: 31.5 % (ref 32–34.5)
MCV RBC AUTO: 74 FL (ref 80–99.9)
MONOCYTES ABSOLUTE: 0.36 E9/L (ref 0.1–0.95)
MONOCYTES RELATIVE PERCENT: 9.4 % (ref 2–12)
NEUTROPHILS ABSOLUTE: 1.47 E9/L (ref 1.8–7.3)
NEUTROPHILS RELATIVE PERCENT: 38.6 % (ref 43–80)
PDW BLD-RTO: 16 FL (ref 11.5–15)
PLATELET # BLD: 195 E9/L (ref 130–450)
PMV BLD AUTO: 10.2 FL (ref 7–12)
POTASSIUM SERPL-SCNC: 4 MMOL/L (ref 3.5–5)
PROSTATE SPECIFIC ANTIGEN: 1.15 NG/ML (ref 0–4)
RBC # BLD: 5.74 E12/L (ref 3.8–5.8)
SODIUM BLD-SCNC: 137 MMOL/L (ref 132–146)
TOTAL PROTEIN: 7.7 G/DL (ref 6.4–8.3)
TRIGL SERPL-MCNC: 43 MG/DL (ref 0–149)
VITAMIN D 25-HYDROXY: 103 NG/ML (ref 30–100)
VLDLC SERPL CALC-MCNC: 9 MG/DL
WBC # BLD: 3.8 E9/L (ref 4.5–11.5)

## 2022-07-28 PROCEDURE — 85025 COMPLETE CBC W/AUTO DIFF WBC: CPT

## 2022-07-28 PROCEDURE — 80053 COMPREHEN METABOLIC PANEL: CPT

## 2022-07-28 PROCEDURE — G0103 PSA SCREENING: HCPCS

## 2022-07-28 PROCEDURE — 80061 LIPID PANEL: CPT

## 2022-07-28 PROCEDURE — 36415 COLL VENOUS BLD VENIPUNCTURE: CPT

## 2022-07-28 PROCEDURE — 82306 VITAMIN D 25 HYDROXY: CPT

## 2022-08-25 ENCOUNTER — HOSPITAL ENCOUNTER (OUTPATIENT)
Dept: CT IMAGING | Age: 67
Discharge: HOME | End: 2022-08-27
Payer: MEDICARE

## 2022-08-25 DIAGNOSIS — R19.05 ABDOMINAL SWELLING, PERIUMBILICAL REGION: ICD-10-CM

## 2022-08-25 PROCEDURE — 74177 CT ABD & PELVIS W/CONTRAST: CPT

## 2022-08-25 PROCEDURE — 6360000004 HC RX CONTRAST MEDICATION: Performed by: RADIOLOGY

## 2022-08-25 RX ORDER — SODIUM CHLORIDE 0.9 % (FLUSH) 0.9 %
10 SYRINGE (ML) INJECTION PRN
Status: DISCONTINUED | OUTPATIENT
Start: 2022-08-25 | End: 2022-08-28 | Stop reason: HOSPADM

## 2022-08-25 RX ADMIN — IOPAMIDOL 75 ML: 755 INJECTION, SOLUTION INTRAVENOUS at 11:31

## 2023-07-21 ENCOUNTER — HOSPITAL ENCOUNTER (OUTPATIENT)
Age: 68
Discharge: HOME | End: 2023-07-21
Payer: MEDICARE

## 2023-07-21 LAB
25(OH)D3 SERPL-MCNC: 80.4 NG/ML (ref 30–100)
ALBUMIN SERPL-MCNC: 4.4 G/DL (ref 3.5–5.2)
ALP SERPL-CCNC: 56 U/L (ref 40–129)
ALT SERPL-CCNC: 16 U/L (ref 0–40)
ANION GAP SERPL CALCULATED.3IONS-SCNC: 14 MMOL/L (ref 7–16)
AST SERPL-CCNC: 25 U/L (ref 0–39)
BASOPHILS # BLD: 0.02 K/UL (ref 0–0.2)
BASOPHILS NFR BLD: 1 % (ref 0–2)
BILIRUB SERPL-MCNC: 0.3 MG/DL (ref 0–1.2)
BUN SERPL-MCNC: 17 MG/DL (ref 6–23)
CALCIUM SERPL-MCNC: 9.9 MG/DL (ref 8.6–10.2)
CHLORIDE SERPL-SCNC: 102 MMOL/L (ref 98–107)
CHOLEST SERPL-MCNC: 124 MG/DL
CO2 SERPL-SCNC: 23 MMOL/L (ref 22–29)
CREAT SERPL-MCNC: 1.1 MG/DL (ref 0.7–1.2)
EOSINOPHIL # BLD: 0.03 K/UL (ref 0.05–0.5)
EOSINOPHILS RELATIVE PERCENT: 1 % (ref 0–6)
ERYTHROCYTE [DISTWIDTH] IN BLOOD BY AUTOMATED COUNT: 15.1 % (ref 11.5–15)
GFR SERPL CREATININE-BSD FRML MDRD: >60 ML/MIN/1.73M2
GLUCOSE SERPL-MCNC: 82 MG/DL (ref 74–99)
HCT VFR BLD AUTO: 45.9 % (ref 37–54)
HDLC SERPL-MCNC: 74 MG/DL
HGB BLD-MCNC: 14 G/DL (ref 12.5–16.5)
IMM GRANULOCYTES # BLD AUTO: <0.03 K/UL (ref 0–0.58)
IMM GRANULOCYTES NFR BLD: 0 % (ref 0–5)
LDLC SERPL CALC-MCNC: 42 MG/DL
LYMPHOCYTES NFR BLD: 1.61 K/UL (ref 1.5–4)
LYMPHOCYTES RELATIVE PERCENT: 39 % (ref 20–42)
MCH RBC QN AUTO: 23.3 PG (ref 26–35)
MCHC RBC AUTO-ENTMCNC: 30.5 G/DL (ref 32–34.5)
MCV RBC AUTO: 76.5 FL (ref 80–99.9)
MONOCYTES NFR BLD: 0.34 K/UL (ref 0.1–0.95)
MONOCYTES NFR BLD: 8 % (ref 2–12)
NEUTROPHILS NFR BLD: 52 % (ref 43–80)
NEUTS SEG NFR BLD: 2.14 K/UL (ref 1.8–7.3)
PLATELET # BLD AUTO: 222 K/UL (ref 130–450)
PMV BLD AUTO: 10 FL (ref 7–12)
POTASSIUM SERPL-SCNC: 4.1 MMOL/L (ref 3.5–5)
PROT SERPL-MCNC: 7.5 G/DL (ref 6.4–8.3)
PSA SERPL-MCNC: 1.52 NG/ML (ref 0–4)
RBC # BLD AUTO: 6 M/UL (ref 3.8–5.8)
SODIUM SERPL-SCNC: 139 MMOL/L (ref 132–146)
TRIGL SERPL-MCNC: 39 MG/DL
VLDLC SERPL CALC-MCNC: 8 MG/DL
WBC OTHER # BLD: 4.2 K/UL (ref 4.5–11.5)

## 2023-07-21 PROCEDURE — 85027 COMPLETE CBC AUTOMATED: CPT

## 2023-07-21 PROCEDURE — 82306 VITAMIN D 25 HYDROXY: CPT

## 2023-07-21 PROCEDURE — G0103 PSA SCREENING: HCPCS

## 2023-07-21 PROCEDURE — 36415 COLL VENOUS BLD VENIPUNCTURE: CPT

## 2023-07-21 PROCEDURE — 80053 COMPREHEN METABOLIC PANEL: CPT

## 2023-07-21 PROCEDURE — 80061 LIPID PANEL: CPT

## 2023-08-30 ENCOUNTER — HOSPITAL ENCOUNTER (OUTPATIENT)
Dept: CT IMAGING | Age: 68
Discharge: HOME | End: 2023-09-01
Attending: INTERNAL MEDICINE
Payer: MEDICARE

## 2023-08-30 DIAGNOSIS — R91.1 LUNG NODULE: ICD-10-CM

## 2023-08-30 PROCEDURE — 71250 CT THORAX DX C-: CPT

## 2023-10-21 ENCOUNTER — APPOINTMENT (OUTPATIENT)
Dept: CT IMAGING | Age: 68
DRG: 871 | End: 2023-10-21
Payer: MEDICARE

## 2023-10-21 ENCOUNTER — HOSPITAL ENCOUNTER (INPATIENT)
Age: 68
LOS: 4 days | Discharge: HOME | DRG: 871 | End: 2023-10-25
Attending: STUDENT IN AN ORGANIZED HEALTH CARE EDUCATION/TRAINING PROGRAM | Admitting: INTERNAL MEDICINE
Payer: MEDICARE

## 2023-10-21 ENCOUNTER — APPOINTMENT (OUTPATIENT)
Dept: GENERAL RADIOLOGY | Age: 68
DRG: 871 | End: 2023-10-21
Payer: MEDICARE

## 2023-10-21 DIAGNOSIS — J18.9 PNEUMONIA OF RIGHT LUNG DUE TO INFECTIOUS ORGANISM, UNSPECIFIED PART OF LUNG: ICD-10-CM

## 2023-10-21 DIAGNOSIS — J44.1 COPD EXACERBATION (HCC): Primary | ICD-10-CM

## 2023-10-21 LAB
ALBUMIN SERPL-MCNC: 4.7 G/DL (ref 3.5–5.2)
ALP SERPL-CCNC: 67 U/L (ref 40–129)
ALT SERPL-CCNC: 20 U/L (ref 0–40)
ANION GAP SERPL CALCULATED.3IONS-SCNC: 14 MMOL/L (ref 7–16)
AST SERPL-CCNC: 49 U/L (ref 0–39)
B PARAP IS1001 DNA NPH QL NAA+NON-PROBE: NOT DETECTED
B PERT DNA SPEC QL NAA+PROBE: NOT DETECTED
B.E.: 3.6 MMOL/L (ref -3–3)
BASOPHILS # BLD: 0.04 K/UL (ref 0–0.2)
BASOPHILS NFR BLD: 0 % (ref 0–2)
BILIRUB SERPL-MCNC: 0.5 MG/DL (ref 0–1.2)
BUN BLD-MCNC: 16 MG/DL (ref 6–23)
BUN SERPL-MCNC: 16 MG/DL (ref 6–23)
C PNEUM DNA NPH QL NAA+NON-PROBE: NOT DETECTED
CALCIUM SERPL-MCNC: 10 MG/DL (ref 8.6–10.2)
CHLORIDE BLD-SCNC: 99 MMOL/L (ref 100–108)
CHLORIDE SERPL-SCNC: 92 MMOL/L (ref 98–107)
CO2 BLD CALC-SCNC: 31 MMOL/L (ref 22–29)
CO2 SERPL-SCNC: 27 MMOL/L (ref 22–29)
COHB: 1.2 % (ref 0–1.5)
CREAT BLD-MCNC: 1.1 MG/DL (ref 0.7–1.2)
CREAT SERPL-MCNC: 1.2 MG/DL (ref 0.7–1.2)
CRITICAL: ABNORMAL
DATE ANALYZED: ABNORMAL
DATE OF COLLECTION: ABNORMAL
EGFR, POC: >60 ML/MIN/1.73M2
EOSINOPHIL # BLD: 0 K/UL (ref 0.05–0.5)
EOSINOPHILS RELATIVE PERCENT: 0 % (ref 0–6)
ERYTHROCYTE [DISTWIDTH] IN BLOOD BY AUTOMATED COUNT: 17.3 % (ref 11.5–15)
FLUAV RNA NPH QL NAA+NON-PROBE: NOT DETECTED
FLUBV RNA NPH QL NAA+NON-PROBE: NOT DETECTED
GFR SERPL CREATININE-BSD FRML MDRD: >60 ML/MIN/1.73M2
GLUCOSE BLD-MCNC: 73 MG/DL (ref 74–99)
GLUCOSE SERPL-MCNC: 71 MG/DL (ref 74–99)
HADV DNA NPH QL NAA+NON-PROBE: NOT DETECTED
HCO3: 27.5 MMOL/L (ref 22–26)
HCOV 229E RNA NPH QL NAA+NON-PROBE: NOT DETECTED
HCOV HKU1 RNA NPH QL NAA+NON-PROBE: NOT DETECTED
HCOV NL63 RNA NPH QL NAA+NON-PROBE: NOT DETECTED
HCOV OC43 RNA NPH QL NAA+NON-PROBE: NOT DETECTED
HCT VFR BLD AUTO: 49.6 % (ref 37–54)
HGB BLD-MCNC: 15.5 G/DL (ref 12.5–16.5)
HHB: 12.2 % (ref 0–5)
HMPV RNA NPH QL NAA+NON-PROBE: NOT DETECTED
HPIV1 RNA NPH QL NAA+NON-PROBE: NOT DETECTED
HPIV2 RNA NPH QL NAA+NON-PROBE: NOT DETECTED
HPIV3 RNA NPH QL NAA+NON-PROBE: NOT DETECTED
HPIV4 RNA NPH QL NAA+NON-PROBE: NOT DETECTED
IMM GRANULOCYTES # BLD AUTO: 0.14 K/UL (ref 0–0.58)
IMM GRANULOCYTES NFR BLD: 1 % (ref 0–5)
LAB: ABNORMAL
LACTATE BLDV-SCNC: 2.2 MMOL/L (ref 0.5–1.9)
LYMPHOCYTES NFR BLD: 1.44 K/UL (ref 1.5–4)
LYMPHOCYTES RELATIVE PERCENT: 8 % (ref 20–42)
Lab: 1640
M PNEUMO DNA NPH QL NAA+NON-PROBE: NOT DETECTED
MCH RBC QN AUTO: 23.7 PG (ref 26–35)
MCHC RBC AUTO-ENTMCNC: 31.3 G/DL (ref 32–34.5)
MCV RBC AUTO: 76 FL (ref 80–99.9)
METHB: 0.4 % (ref 0–1.5)
MODE: ABNORMAL
MONOCYTES NFR BLD: 0.92 K/UL (ref 0.1–0.95)
MONOCYTES NFR BLD: 5 % (ref 2–12)
NEUTROPHILS NFR BLD: 86 % (ref 43–80)
NEUTS SEG NFR BLD: 15.58 K/UL (ref 1.8–7.3)
O2 CONTENT: 18.7 ML/DL
O2 SATURATION: 87.6 % (ref 92–98.5)
O2HB: 86.2 % (ref 94–97)
OPERATOR ID: ABNORMAL
PATIENT TEMP: 37 C
PCO2: 39.2 MMHG (ref 35–45)
PH BLOOD GAS: 7.46 (ref 7.35–7.45)
PLATELET # BLD AUTO: 264 K/UL (ref 130–450)
PMV BLD AUTO: 10.2 FL (ref 7–12)
PO2: 49.5 MMHG (ref 75–100)
POC ANION GAP: 6 MMOL/L (ref 7–16)
POTASSIUM BLD-SCNC: 3.8 MMOL/L (ref 3.5–5)
POTASSIUM SERPL-SCNC: 5.8 MMOL/L (ref 3.5–5)
PROT SERPL-MCNC: 8.7 G/DL (ref 6.4–8.3)
RBC # BLD AUTO: 6.53 M/UL (ref 3.8–5.8)
RSV RNA NPH QL NAA+NON-PROBE: NOT DETECTED
RV+EV RNA NPH QL NAA+NON-PROBE: NOT DETECTED
SARS-COV-2 RNA NPH QL NAA+NON-PROBE: NOT DETECTED
SODIUM BLD-SCNC: 136 MMOL/L (ref 132–146)
SODIUM SERPL-SCNC: 133 MMOL/L (ref 132–146)
SOURCE, BLOOD GAS: ABNORMAL
SPECIMEN DESCRIPTION: NORMAL
THB: 15.5 G/DL (ref 11.5–16.5)
TIME ANALYZED: 1645
WBC OTHER # BLD: 18.1 K/UL (ref 4.5–11.5)

## 2023-10-21 PROCEDURE — 0202U NFCT DS 22 TRGT SARS-COV-2: CPT

## 2023-10-21 PROCEDURE — 2580000003 HC RX 258: Performed by: STUDENT IN AN ORGANIZED HEALTH CARE EDUCATION/TRAINING PROGRAM

## 2023-10-21 PROCEDURE — 2500000003 HC RX 250 WO HCPCS: Performed by: STUDENT IN AN ORGANIZED HEALTH CARE EDUCATION/TRAINING PROGRAM

## 2023-10-21 PROCEDURE — 96374 THER/PROPH/DIAG INJ IV PUSH: CPT

## 2023-10-21 PROCEDURE — 80053 COMPREHEN METABOLIC PANEL: CPT

## 2023-10-21 PROCEDURE — 6360000002 HC RX W HCPCS: Performed by: STUDENT IN AN ORGANIZED HEALTH CARE EDUCATION/TRAINING PROGRAM

## 2023-10-21 PROCEDURE — 82805 BLOOD GASES W/O2 SATURATION: CPT

## 2023-10-21 PROCEDURE — 99285 EMERGENCY DEPT VISIT HI MDM: CPT

## 2023-10-21 PROCEDURE — 6360000002 HC RX W HCPCS

## 2023-10-21 PROCEDURE — 6370000000 HC RX 637 (ALT 250 FOR IP): Performed by: STUDENT IN AN ORGANIZED HEALTH CARE EDUCATION/TRAINING PROGRAM

## 2023-10-21 PROCEDURE — 2580000003 HC RX 258

## 2023-10-21 PROCEDURE — 83605 ASSAY OF LACTIC ACID: CPT

## 2023-10-21 PROCEDURE — 71045 X-RAY EXAM CHEST 1 VIEW: CPT

## 2023-10-21 PROCEDURE — 85025 COMPLETE CBC W/AUTO DIFF WBC: CPT

## 2023-10-21 PROCEDURE — 2140000000 HC CCU INTERMEDIATE R&B

## 2023-10-21 PROCEDURE — 87040 BLOOD CULTURE FOR BACTERIA: CPT

## 2023-10-21 PROCEDURE — 93005 ELECTROCARDIOGRAM TRACING: CPT

## 2023-10-21 PROCEDURE — 94640 AIRWAY INHALATION TREATMENT: CPT

## 2023-10-21 PROCEDURE — 82565 ASSAY OF CREATININE: CPT

## 2023-10-21 PROCEDURE — 84520 ASSAY OF UREA NITROGEN: CPT

## 2023-10-21 PROCEDURE — 82947 ASSAY GLUCOSE BLOOD QUANT: CPT

## 2023-10-21 PROCEDURE — 6360000004 HC RX CONTRAST MEDICATION: Performed by: RADIOLOGY

## 2023-10-21 PROCEDURE — 80051 ELECTROLYTE PANEL: CPT

## 2023-10-21 PROCEDURE — 71275 CT ANGIOGRAPHY CHEST: CPT

## 2023-10-21 PROCEDURE — 96375 TX/PRO/DX INJ NEW DRUG ADDON: CPT

## 2023-10-21 RX ORDER — 0.9 % SODIUM CHLORIDE 0.9 %
1000 INTRAVENOUS SOLUTION INTRAVENOUS ONCE
Status: COMPLETED | OUTPATIENT
Start: 2023-10-21 | End: 2023-10-22

## 2023-10-21 RX ORDER — IPRATROPIUM BROMIDE AND ALBUTEROL SULFATE 2.5; .5 MG/3ML; MG/3ML
3 SOLUTION RESPIRATORY (INHALATION) ONCE
Status: DISCONTINUED | OUTPATIENT
Start: 2023-10-21 | End: 2023-10-21

## 2023-10-21 RX ORDER — IPRATROPIUM BROMIDE AND ALBUTEROL SULFATE 2.5; .5 MG/3ML; MG/3ML
1 SOLUTION RESPIRATORY (INHALATION) ONCE
Status: COMPLETED | OUTPATIENT
Start: 2023-10-21 | End: 2023-10-21

## 2023-10-21 RX ORDER — LEVALBUTEROL INHALATION SOLUTION 0.63 MG/3ML
0.63 SOLUTION RESPIRATORY (INHALATION) ONCE
Status: COMPLETED | OUTPATIENT
Start: 2023-10-21 | End: 2023-10-21

## 2023-10-21 RX ORDER — MAGNESIUM SULFATE IN WATER 40 MG/ML
2000 INJECTION, SOLUTION INTRAVENOUS ONCE
Status: COMPLETED | OUTPATIENT
Start: 2023-10-21 | End: 2023-10-21

## 2023-10-21 RX ADMIN — IPRATROPIUM BROMIDE 0.5 MG: 0.5 SOLUTION RESPIRATORY (INHALATION) at 18:13

## 2023-10-21 RX ADMIN — MAGNESIUM SULFATE HEPTAHYDRATE 2000 MG: 40 INJECTION, SOLUTION INTRAVENOUS at 18:01

## 2023-10-21 RX ADMIN — IOPAMIDOL 75 ML: 755 INJECTION, SOLUTION INTRAVENOUS at 19:18

## 2023-10-21 RX ADMIN — IPRATROPIUM BROMIDE AND ALBUTEROL SULFATE 1 DOSE: 2.5; .5 SOLUTION RESPIRATORY (INHALATION) at 21:12

## 2023-10-21 RX ADMIN — SODIUM CHLORIDE 1000 ML: 9 INJECTION, SOLUTION INTRAVENOUS at 21:21

## 2023-10-21 RX ADMIN — SODIUM CHLORIDE 1000 ML: 9 INJECTION, SOLUTION INTRAVENOUS at 21:22

## 2023-10-21 RX ADMIN — DOXYCYCLINE 100 MG: 100 INJECTION, POWDER, LYOPHILIZED, FOR SOLUTION INTRAVENOUS at 21:28

## 2023-10-21 RX ADMIN — WATER 125 MG: 1 INJECTION INTRAMUSCULAR; INTRAVENOUS; SUBCUTANEOUS at 17:59

## 2023-10-21 RX ADMIN — WATER 1000 MG: 1 INJECTION INTRAMUSCULAR; INTRAVENOUS; SUBCUTANEOUS at 21:25

## 2023-10-21 RX ADMIN — LEVALBUTEROL 0.63 MG: 0.63 SOLUTION RESPIRATORY (INHALATION) at 16:00

## 2023-10-22 LAB
ALBUMIN SERPL-MCNC: 3.5 G/DL (ref 3.5–5.2)
ALP SERPL-CCNC: 48 U/L (ref 40–129)
ALT SERPL-CCNC: 13 U/L (ref 0–40)
ANION GAP SERPL CALCULATED.3IONS-SCNC: 11 MMOL/L (ref 7–16)
AST SERPL-CCNC: 22 U/L (ref 0–39)
BASOPHILS # BLD: 0.03 K/UL (ref 0–0.2)
BASOPHILS NFR BLD: 0 % (ref 0–2)
BILIRUB DIRECT SERPL-MCNC: <0.2 MG/DL (ref 0–0.3)
BILIRUB INDIRECT SERPL-MCNC: NORMAL MG/DL (ref 0–1)
BILIRUB SERPL-MCNC: 0.2 MG/DL (ref 0–1.2)
BUN SERPL-MCNC: 18 MG/DL (ref 6–23)
CALCIUM SERPL-MCNC: 8.6 MG/DL (ref 8.6–10.2)
CHLORIDE SERPL-SCNC: 96 MMOL/L (ref 98–107)
CO2 SERPL-SCNC: 23 MMOL/L (ref 22–29)
CREAT SERPL-MCNC: 1.2 MG/DL (ref 0.7–1.2)
EOSINOPHIL # BLD: 0 K/UL (ref 0.05–0.5)
EOSINOPHILS RELATIVE PERCENT: 0 % (ref 0–6)
ERYTHROCYTE [DISTWIDTH] IN BLOOD BY AUTOMATED COUNT: 15.4 % (ref 11.5–15)
GFR SERPL CREATININE-BSD FRML MDRD: >60 ML/MIN/1.73M2
GLUCOSE SERPL-MCNC: 127 MG/DL (ref 74–99)
HCT VFR BLD AUTO: 40.3 % (ref 37–54)
HGB BLD-MCNC: 12.9 G/DL (ref 12.5–16.5)
IMM GRANULOCYTES # BLD AUTO: 0.2 K/UL (ref 0–0.58)
IMM GRANULOCYTES NFR BLD: 1 % (ref 0–5)
L PNEUMO1 AG UR QL IA.RAPID: NEGATIVE
LACTATE BLDV-SCNC: 2 MMOL/L (ref 0.5–2.2)
LACTATE BLDV-SCNC: 3.7 MMOL/L (ref 0.5–2.2)
LYMPHOCYTES NFR BLD: 0.78 K/UL (ref 1.5–4)
LYMPHOCYTES RELATIVE PERCENT: 3 % (ref 20–42)
MCH RBC QN AUTO: 23.8 PG (ref 26–35)
MCHC RBC AUTO-ENTMCNC: 32 G/DL (ref 32–34.5)
MCV RBC AUTO: 74.2 FL (ref 80–99.9)
MONOCYTES NFR BLD: 0.52 K/UL (ref 0.1–0.95)
MONOCYTES NFR BLD: 2 % (ref 2–12)
NEUTROPHILS NFR BLD: 94 % (ref 43–80)
NEUTS SEG NFR BLD: 21.85 K/UL (ref 1.8–7.3)
PLATELET # BLD AUTO: 202 K/UL (ref 130–450)
PMV BLD AUTO: 9.2 FL (ref 7–12)
POTASSIUM SERPL-SCNC: 4.5 MMOL/L (ref 3.5–5)
PROCALCITONIN SERPL-MCNC: 65.09 NG/ML (ref 0–0.08)
PROT SERPL-MCNC: 6.4 G/DL (ref 6.4–8.3)
RBC # BLD AUTO: 5.43 M/UL (ref 3.8–5.8)
RBC # BLD: ABNORMAL 10*6/UL
S PNEUM AG SPEC QL: POSITIVE
SODIUM SERPL-SCNC: 130 MMOL/L (ref 132–146)
SPECIMEN SOURCE: ABNORMAL
WBC OTHER # BLD: 23.4 K/UL (ref 4.5–11.5)

## 2023-10-22 PROCEDURE — 6370000000 HC RX 637 (ALT 250 FOR IP): Performed by: NURSE PRACTITIONER

## 2023-10-22 PROCEDURE — 82248 BILIRUBIN DIRECT: CPT

## 2023-10-22 PROCEDURE — 87899 AGENT NOS ASSAY W/OPTIC: CPT

## 2023-10-22 PROCEDURE — 87070 CULTURE OTHR SPECIMN AEROBIC: CPT

## 2023-10-22 PROCEDURE — 2580000003 HC RX 258: Performed by: INTERNAL MEDICINE

## 2023-10-22 PROCEDURE — 2140000000 HC CCU INTERMEDIATE R&B

## 2023-10-22 PROCEDURE — 83605 ASSAY OF LACTIC ACID: CPT

## 2023-10-22 PROCEDURE — 2500000003 HC RX 250 WO HCPCS: Performed by: INTERNAL MEDICINE

## 2023-10-22 PROCEDURE — 6360000002 HC RX W HCPCS: Performed by: NURSE PRACTITIONER

## 2023-10-22 PROCEDURE — 94640 AIRWAY INHALATION TREATMENT: CPT

## 2023-10-22 PROCEDURE — 2580000003 HC RX 258: Performed by: NURSE PRACTITIONER

## 2023-10-22 PROCEDURE — 6360000002 HC RX W HCPCS

## 2023-10-22 PROCEDURE — 80053 COMPREHEN METABOLIC PANEL: CPT

## 2023-10-22 PROCEDURE — 2580000003 HC RX 258

## 2023-10-22 PROCEDURE — 2700000000 HC OXYGEN THERAPY PER DAY

## 2023-10-22 PROCEDURE — 6360000002 HC RX W HCPCS: Performed by: INTERNAL MEDICINE

## 2023-10-22 PROCEDURE — 84145 PROCALCITONIN (PCT): CPT

## 2023-10-22 PROCEDURE — 6370000000 HC RX 637 (ALT 250 FOR IP)

## 2023-10-22 PROCEDURE — 87449 NOS EACH ORGANISM AG IA: CPT

## 2023-10-22 PROCEDURE — 85025 COMPLETE CBC W/AUTO DIFF WBC: CPT

## 2023-10-22 PROCEDURE — 87205 SMEAR GRAM STAIN: CPT

## 2023-10-22 RX ORDER — FLUTICASONE PROPIONATE 50 MCG
1 SPRAY, SUSPENSION (ML) NASAL DAILY
COMMUNITY

## 2023-10-22 RX ORDER — IPRATROPIUM BROMIDE AND ALBUTEROL SULFATE 2.5; .5 MG/3ML; MG/3ML
1 SOLUTION RESPIRATORY (INHALATION)
Status: DISCONTINUED | OUTPATIENT
Start: 2023-10-22 | End: 2023-10-22

## 2023-10-22 RX ORDER — ALBUTEROL SULFATE 90 UG/1
2 AEROSOL, METERED RESPIRATORY (INHALATION)
COMMUNITY

## 2023-10-22 RX ORDER — SODIUM CHLORIDE 0.9 % (FLUSH) 0.9 %
5-40 SYRINGE (ML) INJECTION PRN
Status: DISCONTINUED | OUTPATIENT
Start: 2023-10-22 | End: 2023-10-25 | Stop reason: HOSPADM

## 2023-10-22 RX ORDER — ACETAMINOPHEN 325 MG/1
650 TABLET ORAL EVERY 6 HOURS PRN
Status: DISCONTINUED | OUTPATIENT
Start: 2023-10-22 | End: 2023-10-25 | Stop reason: HOSPADM

## 2023-10-22 RX ORDER — HYDROCHLOROTHIAZIDE 25 MG/1
25 TABLET ORAL DAILY
Status: DISCONTINUED | OUTPATIENT
Start: 2023-10-22 | End: 2023-10-25 | Stop reason: HOSPADM

## 2023-10-22 RX ORDER — POTASSIUM CHLORIDE 7.45 MG/ML
10 INJECTION INTRAVENOUS PRN
Status: DISCONTINUED | OUTPATIENT
Start: 2023-10-22 | End: 2023-10-22

## 2023-10-22 RX ORDER — POTASSIUM CHLORIDE 20 MEQ/1
20 TABLET, EXTENDED RELEASE ORAL DAILY
COMMUNITY

## 2023-10-22 RX ORDER — SODIUM CHLORIDE 0.9 % (FLUSH) 0.9 %
5-40 SYRINGE (ML) INJECTION EVERY 12 HOURS SCHEDULED
Status: DISCONTINUED | OUTPATIENT
Start: 2023-10-22 | End: 2023-10-25 | Stop reason: HOSPADM

## 2023-10-22 RX ORDER — GUAIFENESIN 400 MG/1
400 TABLET ORAL 4 TIMES DAILY
Status: DISCONTINUED | OUTPATIENT
Start: 2023-10-22 | End: 2023-10-25 | Stop reason: HOSPADM

## 2023-10-22 RX ORDER — SENNOSIDES A AND B 8.6 MG/1
1 TABLET, FILM COATED ORAL DAILY PRN
Status: DISCONTINUED | OUTPATIENT
Start: 2023-10-22 | End: 2023-10-25 | Stop reason: HOSPADM

## 2023-10-22 RX ORDER — MAGNESIUM SULFATE IN WATER 40 MG/ML
2000 INJECTION, SOLUTION INTRAVENOUS PRN
Status: DISCONTINUED | OUTPATIENT
Start: 2023-10-22 | End: 2023-10-22

## 2023-10-22 RX ORDER — ALBUTEROL SULFATE 2.5 MG/3ML
2.5 SOLUTION RESPIRATORY (INHALATION) EVERY 6 HOURS PRN
Status: DISCONTINUED | OUTPATIENT
Start: 2023-10-22 | End: 2023-10-22

## 2023-10-22 RX ORDER — ACETAMINOPHEN 650 MG/1
650 SUPPOSITORY RECTAL EVERY 6 HOURS PRN
Status: DISCONTINUED | OUTPATIENT
Start: 2023-10-22 | End: 2023-10-25 | Stop reason: HOSPADM

## 2023-10-22 RX ORDER — ALBUTEROL SULFATE 2.5 MG/3ML
2.5 SOLUTION RESPIRATORY (INHALATION)
Status: DISCONTINUED | OUTPATIENT
Start: 2023-10-22 | End: 2023-10-25 | Stop reason: HOSPADM

## 2023-10-22 RX ORDER — BUDESONIDE, GLYCOPYRROLATE, AND FORMOTEROL FUMARATE 160; 9; 4.8 UG/1; UG/1; UG/1
2 AEROSOL, METERED RESPIRATORY (INHALATION) 2 TIMES DAILY
COMMUNITY

## 2023-10-22 RX ORDER — SODIUM CHLORIDE 9 MG/ML
INJECTION, SOLUTION INTRAVENOUS PRN
Status: DISCONTINUED | OUTPATIENT
Start: 2023-10-22 | End: 2023-10-25 | Stop reason: HOSPADM

## 2023-10-22 RX ORDER — ONDANSETRON 2 MG/ML
4 INJECTION INTRAMUSCULAR; INTRAVENOUS EVERY 6 HOURS PRN
Status: DISCONTINUED | OUTPATIENT
Start: 2023-10-22 | End: 2023-10-25 | Stop reason: HOSPADM

## 2023-10-22 RX ORDER — ENOXAPARIN SODIUM 100 MG/ML
40 INJECTION SUBCUTANEOUS DAILY
Status: DISCONTINUED | OUTPATIENT
Start: 2023-10-22 | End: 2023-10-25 | Stop reason: HOSPADM

## 2023-10-22 RX ORDER — GUAIFENESIN/DEXTROMETHORPHAN 100-10MG/5
5 SYRUP ORAL EVERY 4 HOURS PRN
Status: DISCONTINUED | OUTPATIENT
Start: 2023-10-22 | End: 2023-10-25 | Stop reason: HOSPADM

## 2023-10-22 RX ORDER — POTASSIUM CHLORIDE 20 MEQ/1
40 TABLET, EXTENDED RELEASE ORAL PRN
Status: DISCONTINUED | OUTPATIENT
Start: 2023-10-22 | End: 2023-10-22

## 2023-10-22 RX ORDER — SODIUM CHLORIDE 9 MG/ML
INJECTION, SOLUTION INTRAVENOUS CONTINUOUS
Status: DISCONTINUED | OUTPATIENT
Start: 2023-10-22 | End: 2023-10-23

## 2023-10-22 RX ORDER — ERGOCALCIFEROL 1.25 MG/1
50000 CAPSULE ORAL WEEKLY
COMMUNITY

## 2023-10-22 RX ORDER — ONDANSETRON 4 MG/1
4 TABLET, ORALLY DISINTEGRATING ORAL EVERY 8 HOURS PRN
Status: DISCONTINUED | OUTPATIENT
Start: 2023-10-22 | End: 2023-10-25 | Stop reason: HOSPADM

## 2023-10-22 RX ORDER — ARFORMOTEROL TARTRATE 15 UG/2ML
15 SOLUTION RESPIRATORY (INHALATION)
Status: DISCONTINUED | OUTPATIENT
Start: 2023-10-22 | End: 2023-10-25 | Stop reason: HOSPADM

## 2023-10-22 RX ORDER — HYDRALAZINE HYDROCHLORIDE 20 MG/ML
5 INJECTION INTRAMUSCULAR; INTRAVENOUS EVERY 4 HOURS PRN
Status: DISCONTINUED | OUTPATIENT
Start: 2023-10-22 | End: 2023-10-25 | Stop reason: HOSPADM

## 2023-10-22 RX ADMIN — Medication 10 ML: at 15:20

## 2023-10-22 RX ADMIN — ACETAMINOPHEN 650 MG: 325 TABLET ORAL at 04:08

## 2023-10-22 RX ADMIN — HYDRALAZINE HYDROCHLORIDE 5 MG: 20 INJECTION, SOLUTION INTRAMUSCULAR; INTRAVENOUS at 15:18

## 2023-10-22 RX ADMIN — ALBUTEROL SULFATE 2.5 MG: 2.5 SOLUTION RESPIRATORY (INHALATION) at 15:18

## 2023-10-22 RX ADMIN — SODIUM CHLORIDE: 9 INJECTION, SOLUTION INTRAVENOUS at 04:15

## 2023-10-22 RX ADMIN — GUAIFENESIN 400 MG: 400 TABLET ORAL at 17:33

## 2023-10-22 RX ADMIN — ENOXAPARIN SODIUM 40 MG: 100 INJECTION SUBCUTANEOUS at 13:17

## 2023-10-22 RX ADMIN — WATER 60 MG: 1 INJECTION INTRAMUSCULAR; INTRAVENOUS; SUBCUTANEOUS at 04:17

## 2023-10-22 RX ADMIN — GUAIFENESIN 400 MG: 400 TABLET ORAL at 21:11

## 2023-10-22 RX ADMIN — GUAIFENESIN 400 MG: 400 TABLET ORAL at 13:16

## 2023-10-22 RX ADMIN — METHYLPREDNISOLONE SODIUM SUCCINATE 40 MG: 40 INJECTION, POWDER, FOR SOLUTION INTRAMUSCULAR; INTRAVENOUS at 17:33

## 2023-10-22 RX ADMIN — Medication 10 ML: at 21:12

## 2023-10-22 RX ADMIN — IPRATROPIUM BROMIDE AND ALBUTEROL SULFATE 1 DOSE: .5; 2.5 SOLUTION RESPIRATORY (INHALATION) at 06:49

## 2023-10-22 RX ADMIN — DOXYCYCLINE 100 MG: 100 INJECTION, POWDER, LYOPHILIZED, FOR SOLUTION INTRAVENOUS at 17:47

## 2023-10-22 RX ADMIN — VANCOMYCIN HYDROCHLORIDE 1500 MG: 10 INJECTION, POWDER, LYOPHILIZED, FOR SOLUTION INTRAVENOUS at 09:15

## 2023-10-22 RX ADMIN — SODIUM CHLORIDE: 9 INJECTION, SOLUTION INTRAVENOUS at 17:44

## 2023-10-22 RX ADMIN — WATER 1000 MG: 1 INJECTION INTRAMUSCULAR; INTRAVENOUS; SUBCUTANEOUS at 13:18

## 2023-10-22 RX ADMIN — ARFORMOTEROL TARTRATE 15 MCG: 15 SOLUTION RESPIRATORY (INHALATION) at 19:51

## 2023-10-22 RX ADMIN — ALBUTEROL SULFATE 2.5 MG: 2.5 SOLUTION RESPIRATORY (INHALATION) at 19:50

## 2023-10-22 RX ADMIN — IPRATROPIUM BROMIDE AND ALBUTEROL SULFATE 1 DOSE: .5; 2.5 SOLUTION RESPIRATORY (INHALATION) at 11:05

## 2023-10-22 RX ADMIN — ARFORMOTEROL TARTRATE 15 MCG: 15 SOLUTION RESPIRATORY (INHALATION) at 11:32

## 2023-10-22 RX ADMIN — ACETAMINOPHEN 650 MG: 325 TABLET ORAL at 21:11

## 2023-10-22 RX ADMIN — CEFEPIME 2000 MG: 2 INJECTION, POWDER, FOR SOLUTION INTRAVENOUS at 04:16

## 2023-10-22 ASSESSMENT — PAIN SCALES - GENERAL: PAINLEVEL_OUTOF10: 5

## 2023-10-22 NOTE — CONSULTS
Pulmonary Consultation    Admit Date: 10/21/2023    Requesting Physician: Candi Carver MD    CC:  COPD, pneumonia     HPI:  Paola Baez 77 y.o. male former smoker, known to [facility] Dr. Frank Vinson with PMH of bronchiectasis, lung nodule, centrilobular emphysema who originally presented to Urgent Care 10/21 with complaints of shortness of breath which he was treated with breathing treatments. His shortness of breath worsened when he got home therefore called EMS and presented to the ED. Upon arrival, vitals showed , /69, 97% on 6L, afebrile. Labs: ABG 7.46/39.2/49.5/27.5/89% on room air. K 5.8 (hemolyzed), lactic 2., glucose 71, Na 133, BUN/CR 16/1.2, WBC 18.1, H/H 15.5/49.6, PLTs 264   BC x 2 pending. Respiratory panel negative     Imaging:   CTA: neg PE, dense LLL pneumonia and patchy infiltrates right lung base   CXR: negative   Patient received IV Rocephin/doxycycline/vancomycin x 1; duo-nebs; solumedrol 125 mg x 1 in the ED. He was then started on IV cefepime and vancomycin  Pulmonary consulted for COPD/pneumonia. Patient seen on 4L NC with complaints of shortness of breath that has improved since ED admission; still with moist productive cough producing brown sputum; denies wheezing. His respiratory symptoms have been associated with chills without a fever. He has a flutter valve that he reports using maybe once per day but denies having nebulizer. PMH:    COPD   Bronchiectasis   Lung nodule   Past Medical History:   Diagnosis Date    Asthma     Hypertension     Neck mass      PSH:    Past Surgical History:   Procedure Laterality Date    COLONOSCOPY  2012    KNEE SURGERY  09/16/2019    NECK SURGERY Left 6/9/2020    EXCISION LEFT NECK MASS performed by Josefina Orosco MD at [facility] OR          Respiratory ROS: positive for - cough, shortness of breath, and sputum changes Otherwise, a complete review of systems is undertaken and is negative. Social History: Former smoker, quit 2019.

## 2023-10-22 NOTE — H&P
Hospital Medicine History & Physical      PCP: Lazarus Bouchard, MD    Date of Admission: 10/21/2023    Date of Service: . OCT 22, 2023    Chief Complaint:   SOB AND BROWN SPUTUM      History Of Present Illness:     77 y.o. male presented with SOB AND  COUGHING UP BROWN SPUTUM , CHILLS, NO NV OR DIARRHEA, . Past Medical History:          Diagnosis Date    Asthma     COPD (chronic obstructive pulmonary disease) ([address])     Ex-smoker for more than 1 year     [place] (hard of hearing)     R ear wears hearing aide (R ear)    Hypertension     Neck mass        Past Surgical History:          Procedure Laterality Date    COLONOSCOPY  2012    KNEE SURGERY  09/16/2019    NECK SURGERY Left 6/9/2020    EXCISION LEFT NECK MASS performed by Dima Vallejo MD at [facility] OR       Medications Prior to Admission:      Prior to Admission medications    Medication Sig Start Date End Date Taking?  Authorizing Provider   albuterol sulfate HFA (VENTOLIN HFA) 108 (90 Base) MCG/ACT inhaler Inhale 2 puffs into the lungs every 4-6 hours as needed for Wheezing or Shortness of Breath   Yes Tony [place] MD   Budeson-Glycopyrrol-Formoterol (BREZTRI AEROSPHERE) 160-9-4.8 MCG/ACT AERO Inhale 2 puffs into the lungs in the morning and at bedtime   Yes Tony [place] MD   fluticasone (FLONASE) 50 MCG/ACT nasal spray 1 spray by Each Nostril route daily   Yes Tony [place] MD   potassium chloride (KLOR-CON M) 20 MEQ extended release tablet Take 1 tablet by mouth daily   Yes Tony [place] MD   vitamin D (ERGOCALCIFEROL) 1.25 MG (99874 UT) CAPS capsule Take 1 capsule by mouth once a week   Yes Tony [place] MD   hydrochlorothiazide (HYDRODIURIL) 25 MG tablet Take 1 tablet by mouth daily    Tony [place] MD   Multiple Vitamins-Minerals (THERAPEUTIC MULTIVITAMIN-MINERALS) tablet Take 1 tablet by

## 2023-10-23 LAB
ANION GAP SERPL CALCULATED.3IONS-SCNC: 9 MMOL/L (ref 7–16)
BASOPHILS # BLD: 0.03 K/UL (ref 0–0.2)
BASOPHILS NFR BLD: 0 % (ref 0–2)
BUN SERPL-MCNC: 20 MG/DL (ref 6–23)
CALCIUM SERPL-MCNC: 8.7 MG/DL (ref 8.6–10.2)
CHLORIDE SERPL-SCNC: 102 MMOL/L (ref 98–107)
CO2 SERPL-SCNC: 26 MMOL/L (ref 22–29)
CREAT SERPL-MCNC: 1 MG/DL (ref 0.7–1.2)
EOSINOPHIL # BLD: 0 K/UL (ref 0.05–0.5)
EOSINOPHILS RELATIVE PERCENT: 0 % (ref 0–6)
ERYTHROCYTE [DISTWIDTH] IN BLOOD BY AUTOMATED COUNT: 15.5 % (ref 11.5–15)
GFR SERPL CREATININE-BSD FRML MDRD: >60 ML/MIN/1.73M2
GLUCOSE SERPL-MCNC: 115 MG/DL (ref 74–99)
HCT VFR BLD AUTO: 36.4 % (ref 37–54)
HGB BLD-MCNC: 11.4 G/DL (ref 12.5–16.5)
IMM GRANULOCYTES # BLD AUTO: 0.22 K/UL (ref 0–0.58)
IMM GRANULOCYTES NFR BLD: 1 % (ref 0–5)
LYMPHOCYTES NFR BLD: 0.69 K/UL (ref 1.5–4)
LYMPHOCYTES RELATIVE PERCENT: 3 % (ref 20–42)
MAGNESIUM SERPL-MCNC: 2.1 MG/DL (ref 1.6–2.6)
MCH RBC QN AUTO: 23.2 PG (ref 26–35)
MCHC RBC AUTO-ENTMCNC: 31.3 G/DL (ref 32–34.5)
MCV RBC AUTO: 74 FL (ref 80–99.9)
MICROORGANISM SPEC CULT: ABNORMAL
MICROORGANISM/AGENT SPEC: ABNORMAL
MONOCYTES NFR BLD: 0.71 K/UL (ref 0.1–0.95)
MONOCYTES NFR BLD: 3 % (ref 2–12)
NEUTROPHILS NFR BLD: 92 % (ref 43–80)
NEUTS SEG NFR BLD: 19.41 K/UL (ref 1.8–7.3)
PLATELET # BLD AUTO: 180 K/UL (ref 130–450)
PMV BLD AUTO: 9.9 FL (ref 7–12)
POTASSIUM SERPL-SCNC: 4.3 MMOL/L (ref 3.5–5)
RBC # BLD AUTO: 4.92 M/UL (ref 3.8–5.8)
RBC # BLD: ABNORMAL 10*6/UL
SODIUM SERPL-SCNC: 137 MMOL/L (ref 132–146)
SPECIMEN DESCRIPTION: ABNORMAL
WBC OTHER # BLD: 21.1 K/UL (ref 4.5–11.5)

## 2023-10-23 PROCEDURE — 6360000002 HC RX W HCPCS

## 2023-10-23 PROCEDURE — 2580000003 HC RX 258: Performed by: INTERNAL MEDICINE

## 2023-10-23 PROCEDURE — 80048 BASIC METABOLIC PNL TOTAL CA: CPT

## 2023-10-23 PROCEDURE — 2500000003 HC RX 250 WO HCPCS: Performed by: INTERNAL MEDICINE

## 2023-10-23 PROCEDURE — 85025 COMPLETE CBC W/AUTO DIFF WBC: CPT

## 2023-10-23 PROCEDURE — 2580000003 HC RX 258: Performed by: NURSE PRACTITIONER

## 2023-10-23 PROCEDURE — 83735 ASSAY OF MAGNESIUM: CPT

## 2023-10-23 PROCEDURE — 2580000003 HC RX 258

## 2023-10-23 PROCEDURE — 2140000000 HC CCU INTERMEDIATE R&B

## 2023-10-23 PROCEDURE — 97165 OT EVAL LOW COMPLEX 30 MIN: CPT

## 2023-10-23 PROCEDURE — 97530 THERAPEUTIC ACTIVITIES: CPT

## 2023-10-23 PROCEDURE — 97535 SELF CARE MNGMENT TRAINING: CPT

## 2023-10-23 PROCEDURE — 94640 AIRWAY INHALATION TREATMENT: CPT

## 2023-10-23 PROCEDURE — 6360000002 HC RX W HCPCS: Performed by: NURSE PRACTITIONER

## 2023-10-23 PROCEDURE — 6360000002 HC RX W HCPCS: Performed by: INTERNAL MEDICINE

## 2023-10-23 PROCEDURE — 97161 PT EVAL LOW COMPLEX 20 MIN: CPT

## 2023-10-23 PROCEDURE — 36415 COLL VENOUS BLD VENIPUNCTURE: CPT

## 2023-10-23 PROCEDURE — 2700000000 HC OXYGEN THERAPY PER DAY

## 2023-10-23 PROCEDURE — 6370000000 HC RX 637 (ALT 250 FOR IP)

## 2023-10-23 RX ADMIN — ALBUTEROL SULFATE 2.5 MG: 2.5 SOLUTION RESPIRATORY (INHALATION) at 11:35

## 2023-10-23 RX ADMIN — ALBUTEROL SULFATE 2.5 MG: 2.5 SOLUTION RESPIRATORY (INHALATION) at 20:50

## 2023-10-23 RX ADMIN — METHYLPREDNISOLONE SODIUM SUCCINATE 40 MG: 40 INJECTION, POWDER, FOR SOLUTION INTRAMUSCULAR; INTRAVENOUS at 04:03

## 2023-10-23 RX ADMIN — ENOXAPARIN SODIUM 40 MG: 100 INJECTION SUBCUTANEOUS at 09:53

## 2023-10-23 RX ADMIN — ARFORMOTEROL TARTRATE 15 MCG: 15 SOLUTION RESPIRATORY (INHALATION) at 20:50

## 2023-10-23 RX ADMIN — DOXYCYCLINE 100 MG: 100 INJECTION, POWDER, LYOPHILIZED, FOR SOLUTION INTRAVENOUS at 04:03

## 2023-10-23 RX ADMIN — GUAIFENESIN 400 MG: 400 TABLET ORAL at 16:26

## 2023-10-23 RX ADMIN — ALBUTEROL SULFATE 2.5 MG: 2.5 SOLUTION RESPIRATORY (INHALATION) at 16:01

## 2023-10-23 RX ADMIN — METHYLPREDNISOLONE SODIUM SUCCINATE 40 MG: 40 INJECTION, POWDER, FOR SOLUTION INTRAMUSCULAR; INTRAVENOUS at 09:52

## 2023-10-23 RX ADMIN — GUAIFENESIN 400 MG: 400 TABLET ORAL at 09:53

## 2023-10-23 RX ADMIN — ALBUTEROL SULFATE 2.5 MG: 2.5 SOLUTION RESPIRATORY (INHALATION) at 08:15

## 2023-10-23 RX ADMIN — GUAIFENESIN 400 MG: 400 TABLET ORAL at 11:38

## 2023-10-23 RX ADMIN — Medication 10 ML: at 20:15

## 2023-10-23 RX ADMIN — ARFORMOTEROL TARTRATE 15 MCG: 15 SOLUTION RESPIRATORY (INHALATION) at 08:15

## 2023-10-23 RX ADMIN — Medication 10 ML: at 09:53

## 2023-10-23 RX ADMIN — METHYLPREDNISOLONE SODIUM SUCCINATE 40 MG: 40 INJECTION, POWDER, FOR SOLUTION INTRAMUSCULAR; INTRAVENOUS at 20:15

## 2023-10-23 RX ADMIN — GUAIFENESIN 400 MG: 400 TABLET ORAL at 20:15

## 2023-10-23 RX ADMIN — WATER 1000 MG: 1 INJECTION INTRAMUSCULAR; INTRAVENOUS; SUBCUTANEOUS at 11:37

## 2023-10-23 NOTE — ACP (ADVANCE CARE PLANNING)
Advance Care Planning     Advance Care Planning Inpatient Note  [facility]    Today's Date: 10/23/2023  Unit: [facility]    Received request from IDT Member. Upon review of chart and communication with care team, patient's decision making abilities are not in question. . Patient was/were present in the room during visit. Goals of ACP Conversation:  Discuss advance care planning documents    Health Care Decision Makers:       Primary Decision Maker: Constanza Kaur - Other - [phone]    Secondary Decision Maker: Lizzie Scarlet Edmond Child - [phone]  Summary:  Completed [address]    Advance Care Planning Documents (Patient Wishes):  Healthcare Power of /Advance Directive Appointment of Health Care Agent     Assessment: Mykel Zhou completed his health care power of . Interventions:  Provided education on documents for clarity and greater understanding  Assisted in the completion of documents according to patient's wishes at this time  Encouraged ongoing ACP conversation with future decision makers and loved ones    Care Preferences Communicated:     Hospitalization:  If the patient's health worsens and it becomes clear that the chance of recovery is unlikely,     the patient wants hospitalization. Mykel Zhou wants his POA to make decisions if he could not. I encouraged ongoing conversation. Ventilation:   If the patient, in their present state of health, suddenly became very ill and unable to breathe on their own,     the patient would desire the use of a ventilator (breathing machine). If their health worsens and it becomes clear that the change of recovery is unlikely,     the patient would desire the use of a ventilator (breathing machine).     Resuscitation:  In the event the patient's heart stopped as a result of an underlying serious health condition, the patient communicates a preference for

## 2023-10-23 NOTE — ACP (ADVANCE CARE PLANNING)
Advance Care Planning   Healthcare Decision Maker:    Primary Decision Maker: Sharahenrry Frias - [phone]    Secondary Decision Maker: Agustin Kilo Feli Bobby - [phone]    Today we documented Decision Maker(s) consistent with ACP documents on file. HCPOA paperwork completed today at bedside with ; copy placed in the chart.     Electronically signed by Alex Cottrell on 10/23/2023 at 12:34 PM     .

## 2023-10-23 NOTE — CARE COORDINATION
Patient presented to the ED due to c/o shortness of breath; admitted due to pneumonia, COPD exacerbation. Met with patient at bedside due to transition of care planning. Patient reports he lives in a home with his significant other, Mary Angulo and son in a ranch style home. He reports he and Mary Angulo are not  but have been together for over 36 years. Patient is independent in the room without a device, drives, reports no home DME. Uses [facility] [place]) and PCP is Dr. Chantal Loomis. Patient currently on 3L NC, no oxygen at baseline; has no preference on DME company if oxygen is needed at discharge. Plan is home, no needs reported and daughter, Jeannie Mcginnis to transport. Case Management Assessment  Initial Evaluation    Date/Time of Evaluation: 10/23/2023 12:15 PM  Assessment Completed by: Amy Eason    If patient is discharged prior to next notation, then this note serves as note for discharge by case management. Patient Name: Rich Bryant                   YOB: 1955  Diagnosis: Pneumonia due to organism [J18.9]  COPD exacerbation [facility]) [J44.1]                   Date / Time: 10/21/2023  3:54 PM    Patient Admission Status: Inpatient   Readmission Risk (Low < 19, Mod (19-27), High > 27): Readmission Risk Score: 7.6    Current PCP: Nickie Bobo MD  PCP verified by ? Yes    Chart Reviewed: Yes      History Provided by: Patient  Patient Orientation: Alert and Oriented    Patient Cognition: Alert    Hospitalization in the last 30 days (Readmission):  No    If yes, Readmission Assessment in  Navigator will be completed.     Advance Directives:      Code Status: Full Code   Patient's Primary Decision Maker is: Legal Next of Kin      Discharge Planning:    Patient lives with: Family Members Type of Home: House  Primary Care Giver: Self  Patient Support Systems include: Spouse/Significant Other, Children   Current Financial resources:    Current community resources:    Current services

## 2023-10-24 LAB
ANION GAP SERPL CALCULATED.3IONS-SCNC: 8 MMOL/L (ref 7–16)
BASOPHILS # BLD: 0.01 K/UL (ref 0–0.2)
BASOPHILS NFR BLD: 0 % (ref 0–2)
BUN SERPL-MCNC: 23 MG/DL (ref 6–23)
CALCIUM SERPL-MCNC: 9.4 MG/DL (ref 8.6–10.2)
CHLORIDE SERPL-SCNC: 101 MMOL/L (ref 98–107)
CO2 SERPL-SCNC: 28 MMOL/L (ref 22–29)
CREAT SERPL-MCNC: 1 MG/DL (ref 0.7–1.2)
EKG ATRIAL RATE: 147 BPM
EKG P AXIS: 82 DEGREES
EKG P-R INTERVAL: 118 MS
EKG Q-T INTERVAL: 274 MS
EKG QRS DURATION: 82 MS
EKG QTC CALCULATION (BAZETT): 426 MS
EKG R AXIS: 89 DEGREES
EKG T AXIS: 60 DEGREES
EKG VENTRICULAR RATE: 146 BPM
EOSINOPHIL # BLD: 0 K/UL (ref 0.05–0.5)
EOSINOPHILS RELATIVE PERCENT: 0 % (ref 0–6)
ERYTHROCYTE [DISTWIDTH] IN BLOOD BY AUTOMATED COUNT: 15.5 % (ref 11.5–15)
GFR SERPL CREATININE-BSD FRML MDRD: >60 ML/MIN/1.73M2
GLUCOSE SERPL-MCNC: 119 MG/DL (ref 74–99)
HCT VFR BLD AUTO: 36.3 % (ref 37–54)
HGB BLD-MCNC: 11.7 G/DL (ref 12.5–16.5)
IMM GRANULOCYTES # BLD AUTO: 0.14 K/UL (ref 0–0.58)
IMM GRANULOCYTES NFR BLD: 1 % (ref 0–5)
LACTATE BLDV-SCNC: 0.8 MMOL/L (ref 0.5–1.9)
LYMPHOCYTES NFR BLD: 0.58 K/UL (ref 1.5–4)
LYMPHOCYTES RELATIVE PERCENT: 4 % (ref 20–42)
MCH RBC QN AUTO: 23.6 PG (ref 26–35)
MCHC RBC AUTO-ENTMCNC: 32.2 G/DL (ref 32–34.5)
MCV RBC AUTO: 73.3 FL (ref 80–99.9)
MONOCYTES NFR BLD: 0.35 K/UL (ref 0.1–0.95)
MONOCYTES NFR BLD: 3 % (ref 2–12)
NEUTROPHILS NFR BLD: 92 % (ref 43–80)
NEUTS SEG NFR BLD: 12.39 K/UL (ref 1.8–7.3)
PLATELET # BLD AUTO: 195 K/UL (ref 130–450)
PMV BLD AUTO: 10.5 FL (ref 7–12)
POTASSIUM SERPL-SCNC: 4.5 MMOL/L (ref 3.5–5)
RBC # BLD AUTO: 4.95 M/UL (ref 3.8–5.8)
RBC # BLD: ABNORMAL 10*6/UL
SODIUM SERPL-SCNC: 137 MMOL/L (ref 132–146)
WBC OTHER # BLD: 13.5 K/UL (ref 4.5–11.5)

## 2023-10-24 PROCEDURE — 6370000000 HC RX 637 (ALT 250 FOR IP)

## 2023-10-24 PROCEDURE — 6360000002 HC RX W HCPCS: Performed by: NURSE PRACTITIONER

## 2023-10-24 PROCEDURE — 2700000000 HC OXYGEN THERAPY PER DAY

## 2023-10-24 PROCEDURE — 36415 COLL VENOUS BLD VENIPUNCTURE: CPT

## 2023-10-24 PROCEDURE — 6370000000 HC RX 637 (ALT 250 FOR IP): Performed by: NURSE PRACTITIONER

## 2023-10-24 PROCEDURE — 2580000003 HC RX 258: Performed by: NURSE PRACTITIONER

## 2023-10-24 PROCEDURE — 85025 COMPLETE CBC W/AUTO DIFF WBC: CPT

## 2023-10-24 PROCEDURE — 93010 ELECTROCARDIOGRAM REPORT: CPT | Performed by: INTERNAL MEDICINE

## 2023-10-24 PROCEDURE — 2140000000 HC CCU INTERMEDIATE R&B

## 2023-10-24 PROCEDURE — 6360000002 HC RX W HCPCS: Performed by: INTERNAL MEDICINE

## 2023-10-24 PROCEDURE — 94640 AIRWAY INHALATION TREATMENT: CPT

## 2023-10-24 PROCEDURE — 83605 ASSAY OF LACTIC ACID: CPT

## 2023-10-24 PROCEDURE — 80048 BASIC METABOLIC PNL TOTAL CA: CPT

## 2023-10-24 PROCEDURE — 87040 BLOOD CULTURE FOR BACTERIA: CPT

## 2023-10-24 RX ORDER — PREDNISONE 20 MG/1
20 TABLET ORAL DAILY
Status: DISCONTINUED | OUTPATIENT
Start: 2023-10-31 | End: 2023-10-25 | Stop reason: HOSPADM

## 2023-10-24 RX ORDER — PREDNISONE 5 MG/1
10 TABLET ORAL DAILY
Status: DISCONTINUED | OUTPATIENT
Start: 2023-11-03 | End: 2023-10-25 | Stop reason: HOSPADM

## 2023-10-24 RX ORDER — BUDESONIDE 0.5 MG/2ML
500 INHALANT ORAL
Status: DISCONTINUED | OUTPATIENT
Start: 2023-10-25 | End: 2023-10-25 | Stop reason: HOSPADM

## 2023-10-24 RX ORDER — PREDNISONE 20 MG/1
40 TABLET ORAL DAILY
Status: DISCONTINUED | OUTPATIENT
Start: 2023-10-25 | End: 2023-10-25 | Stop reason: HOSPADM

## 2023-10-24 RX ORDER — LEVOFLOXACIN 750 MG/1
750 TABLET ORAL DAILY
Status: DISCONTINUED | OUTPATIENT
Start: 2023-10-25 | End: 2023-10-25 | Stop reason: HOSPADM

## 2023-10-24 RX ADMIN — Medication 10 ML: at 20:19

## 2023-10-24 RX ADMIN — METHYLPREDNISOLONE SODIUM SUCCINATE 40 MG: 40 INJECTION, POWDER, FOR SOLUTION INTRAMUSCULAR; INTRAVENOUS at 09:17

## 2023-10-24 RX ADMIN — ARFORMOTEROL TARTRATE 15 MCG: 15 SOLUTION RESPIRATORY (INHALATION) at 08:02

## 2023-10-24 RX ADMIN — METHYLPREDNISOLONE SODIUM SUCCINATE 40 MG: 40 INJECTION, POWDER, FOR SOLUTION INTRAMUSCULAR; INTRAVENOUS at 21:35

## 2023-10-24 RX ADMIN — ALBUTEROL SULFATE 2.5 MG: 2.5 SOLUTION RESPIRATORY (INHALATION) at 11:38

## 2023-10-24 RX ADMIN — GUAIFENESIN 400 MG: 400 TABLET ORAL at 09:17

## 2023-10-24 RX ADMIN — GUAIFENESIN 400 MG: 400 TABLET ORAL at 20:19

## 2023-10-24 RX ADMIN — ALBUTEROL SULFATE 2.5 MG: 2.5 SOLUTION RESPIRATORY (INHALATION) at 15:38

## 2023-10-24 RX ADMIN — Medication 10 ML: at 09:17

## 2023-10-24 RX ADMIN — GUAIFENESIN 400 MG: 400 TABLET ORAL at 14:06

## 2023-10-24 RX ADMIN — ALBUTEROL SULFATE 2.5 MG: 2.5 SOLUTION RESPIRATORY (INHALATION) at 08:02

## 2023-10-24 RX ADMIN — ARFORMOTEROL TARTRATE 15 MCG: 15 SOLUTION RESPIRATORY (INHALATION) at 19:18

## 2023-10-24 RX ADMIN — ACETAMINOPHEN 650 MG: 325 TABLET ORAL at 17:45

## 2023-10-24 RX ADMIN — GUAIFENESIN 400 MG: 400 TABLET ORAL at 17:46

## 2023-10-24 RX ADMIN — WATER 1000 MG: 1 INJECTION INTRAMUSCULAR; INTRAVENOUS; SUBCUTANEOUS at 11:08

## 2023-10-24 RX ADMIN — ENOXAPARIN SODIUM 40 MG: 100 INJECTION SUBCUTANEOUS at 09:17

## 2023-10-24 RX ADMIN — ALBUTEROL SULFATE 2.5 MG: 2.5 SOLUTION RESPIRATORY (INHALATION) at 19:18

## 2023-10-24 ASSESSMENT — PAIN DESCRIPTION - LOCATION
LOCATION: HEAD
LOCATION: HEAD

## 2023-10-24 ASSESSMENT — PAIN DESCRIPTION - DESCRIPTORS
DESCRIPTORS: ACHING
DESCRIPTORS: ACHING

## 2023-10-24 ASSESSMENT — PAIN SCALES - GENERAL
PAINLEVEL_OUTOF10: 0
PAINLEVEL_OUTOF10: 3
PAINLEVEL_OUTOF10: 3

## 2023-10-24 NOTE — CARE COORDINATION
Patient currently on 1L NC, no oxygen at baseline; will need an ambulating pulse-ox test prior to discharge. Patient currently on IV Solumedrol Q12 and IV Rocephin Q24; pulmonology following. Plan is home, no needs reported and daughter, Kamran Farooq to transport.      Electronically signed by ANA Boateng on 10/24/2023 at 3:51 PM

## 2023-10-25 VITALS
HEART RATE: 106 BPM | SYSTOLIC BLOOD PRESSURE: 122 MMHG | HEIGHT: 69 IN | WEIGHT: 167.2 LBS | DIASTOLIC BLOOD PRESSURE: 89 MMHG | BODY MASS INDEX: 24.76 KG/M2 | TEMPERATURE: 98.1 F | RESPIRATION RATE: 18 BRPM | OXYGEN SATURATION: 93 %

## 2023-10-25 LAB
ANION GAP SERPL CALCULATED.3IONS-SCNC: 8 MMOL/L (ref 7–16)
BUN SERPL-MCNC: 24 MG/DL (ref 6–23)
CALCIUM SERPL-MCNC: 9.4 MG/DL (ref 8.6–10.2)
CHLORIDE SERPL-SCNC: 99 MMOL/L (ref 98–107)
CO2 SERPL-SCNC: 28 MMOL/L (ref 22–29)
CREAT SERPL-MCNC: 0.9 MG/DL (ref 0.7–1.2)
GFR SERPL CREATININE-BSD FRML MDRD: >60 ML/MIN/1.73M2
GLUCOSE SERPL-MCNC: 108 MG/DL (ref 74–99)
POTASSIUM SERPL-SCNC: 4.5 MMOL/L (ref 3.5–5)
SODIUM SERPL-SCNC: 135 MMOL/L (ref 132–146)

## 2023-10-25 PROCEDURE — 97530 THERAPEUTIC ACTIVITIES: CPT

## 2023-10-25 PROCEDURE — 94640 AIRWAY INHALATION TREATMENT: CPT

## 2023-10-25 PROCEDURE — 6360000002 HC RX W HCPCS: Performed by: NURSE PRACTITIONER

## 2023-10-25 PROCEDURE — 36415 COLL VENOUS BLD VENIPUNCTURE: CPT

## 2023-10-25 PROCEDURE — 80048 BASIC METABOLIC PNL TOTAL CA: CPT

## 2023-10-25 PROCEDURE — 6360000002 HC RX W HCPCS: Performed by: INTERNAL MEDICINE

## 2023-10-25 PROCEDURE — 6370000000 HC RX 637 (ALT 250 FOR IP): Performed by: NURSE PRACTITIONER

## 2023-10-25 PROCEDURE — 6370000000 HC RX 637 (ALT 250 FOR IP)

## 2023-10-25 PROCEDURE — 2580000003 HC RX 258: Performed by: NURSE PRACTITIONER

## 2023-10-25 RX ORDER — GUAIFENESIN/DEXTROMETHORPHAN 100-10MG/5
5 SYRUP ORAL EVERY 4 HOURS PRN
Qty: 120 ML | COMMUNITY
Start: 2023-10-25 | End: 2023-11-04

## 2023-10-25 RX ORDER — PREDNISONE 10 MG/1
TABLET ORAL
Qty: 30 TABLET | Refills: 0 | Status: SHIPPED | OUTPATIENT
Start: 2023-10-25

## 2023-10-25 RX ORDER — LEVOFLOXACIN 750 MG/1
750 TABLET ORAL DAILY
Qty: 4 TABLET | Refills: 0 | Status: SHIPPED | OUTPATIENT
Start: 2023-10-26 | End: 2023-10-30

## 2023-10-25 RX ADMIN — ALBUTEROL SULFATE 2.5 MG: 2.5 SOLUTION RESPIRATORY (INHALATION) at 11:00

## 2023-10-25 RX ADMIN — ARFORMOTEROL TARTRATE 15 MCG: 15 SOLUTION RESPIRATORY (INHALATION) at 07:25

## 2023-10-25 RX ADMIN — ALBUTEROL SULFATE 2.5 MG: 2.5 SOLUTION RESPIRATORY (INHALATION) at 07:25

## 2023-10-25 RX ADMIN — ALBUTEROL SULFATE 2.5 MG: 2.5 SOLUTION RESPIRATORY (INHALATION) at 14:51

## 2023-10-25 RX ADMIN — GUAIFENESIN 400 MG: 400 TABLET ORAL at 13:48

## 2023-10-25 RX ADMIN — LEVOFLOXACIN 750 MG: 750 TABLET, FILM COATED ORAL at 09:07

## 2023-10-25 RX ADMIN — PREDNISONE 40 MG: 20 TABLET ORAL at 09:06

## 2023-10-25 RX ADMIN — GUAIFENESIN 400 MG: 400 TABLET ORAL at 09:07

## 2023-10-25 RX ADMIN — HYDROCHLOROTHIAZIDE 25 MG: 25 TABLET ORAL at 13:23

## 2023-10-25 RX ADMIN — HYDRALAZINE HYDROCHLORIDE 5 MG: 20 INJECTION, SOLUTION INTRAMUSCULAR; INTRAVENOUS at 13:24

## 2023-10-25 RX ADMIN — ENOXAPARIN SODIUM 40 MG: 100 INJECTION SUBCUTANEOUS at 09:06

## 2023-10-25 RX ADMIN — Medication 10 ML: at 09:08

## 2023-10-25 RX ADMIN — BUDESONIDE INHALATION 500 MCG: 0.5 SUSPENSION RESPIRATORY (INHALATION) at 07:25

## 2023-10-25 NOTE — CARE COORDINATION
Discharge order noted. Patient to discharge home today, ambulating pulse-ox testing to be completed prior to discharge. Patient has no preference on DME company and daughter to transport home.     Electronically signed by ANA Coleman on 10/25/2023 at 10:19 AM

## 2023-10-26 LAB
MICROORGANISM SPEC CULT: NORMAL
MICROORGANISM SPEC CULT: NORMAL
SERVICE CMNT-IMP: NORMAL
SERVICE CMNT-IMP: NORMAL
SPECIMEN DESCRIPTION: NORMAL
SPECIMEN DESCRIPTION: NORMAL

## 2023-10-28 NOTE — DISCHARGE SUMMARY
Phone: [phone]   levoFLOXacin 750 MG tablet  predniSONE 10 MG tablet       You can get these medications from any pharmacy    You don't need a prescription for these medications  guaiFENesin-dextromethorphan 100-10 MG/5ML syrup         Time Spent on discharge is 45 minutes in the review of medications and discharge plan  and EXAM.    +++++++++++++++++++++++++++++++++++++++++++++++++  Martin Liriano, DO  [facility] [place], [place]  +++++++++++++++++++++++++++++++++++++++++++++++++  NOTE: This report was transcribed using voice recognition software. Every effort was made to ensure accuracy; however, inadvertent computerized transcription errors may be present.

## 2024-01-03 ENCOUNTER — HOSPITAL ENCOUNTER (OUTPATIENT)
Dept: CT IMAGING | Age: 69
Discharge: HOME | End: 2024-01-05
Attending: INTERNAL MEDICINE
Payer: MEDICARE

## 2024-01-03 DIAGNOSIS — J18.9 PNEUMONIA DUE TO INFECTIOUS ORGANISM, UNSPECIFIED LATERALITY, UNSPECIFIED PART OF LUNG: ICD-10-CM

## 2024-01-03 PROCEDURE — 71250 CT THORAX DX C-: CPT

## 2024-08-15 ENCOUNTER — HOSPITAL ENCOUNTER (OUTPATIENT)
Dept: CT IMAGING | Age: 69
Discharge: HOME | End: 2024-08-17
Attending: INTERNAL MEDICINE
Payer: MEDICARE

## 2024-08-15 DIAGNOSIS — R91.1 LUNG NODULE: ICD-10-CM

## 2024-08-15 PROCEDURE — 71250 CT THORAX DX C-: CPT

## 2024-12-02 ENCOUNTER — HOSPITAL ENCOUNTER (EMERGENCY)
Age: 69
Discharge: HOME | End: 2024-12-03
Attending: STUDENT IN AN ORGANIZED HEALTH CARE EDUCATION/TRAINING PROGRAM
Payer: MEDICARE

## 2024-12-02 ENCOUNTER — APPOINTMENT (OUTPATIENT)
Dept: CT IMAGING | Age: 69
End: 2024-12-02
Payer: MEDICARE

## 2024-12-02 VITALS
OXYGEN SATURATION: 98 % | TEMPERATURE: 98 F | WEIGHT: 155 LBS | BODY MASS INDEX: 22.96 KG/M2 | DIASTOLIC BLOOD PRESSURE: 77 MMHG | HEART RATE: 100 BPM | RESPIRATION RATE: 16 BRPM | HEIGHT: 69 IN | SYSTOLIC BLOOD PRESSURE: 144 MMHG

## 2024-12-02 DIAGNOSIS — J44.1 COPD EXACERBATION (HCC): Primary | ICD-10-CM

## 2024-12-02 LAB
ALBUMIN SERPL-MCNC: 4.7 G/DL (ref 3.5–5.2)
ALP SERPL-CCNC: 75 U/L (ref 40–129)
ALT SERPL-CCNC: 15 U/L (ref 0–40)
ANION GAP SERPL CALCULATED.3IONS-SCNC: 9 MMOL/L (ref 7–16)
AST SERPL-CCNC: 31 U/L (ref 0–39)
BASOPHILS # BLD: 0.02 K/UL (ref 0–0.2)
BASOPHILS NFR BLD: 0 % (ref 0–2)
BILIRUB SERPL-MCNC: 0.3 MG/DL (ref 0–1.2)
BNP SERPL-MCNC: 48 PG/ML (ref 0–125)
BUN BLD-MCNC: 17 MG/DL (ref 6–23)
BUN SERPL-MCNC: 17 MG/DL (ref 6–23)
CALCIUM SERPL-MCNC: 10.2 MG/DL (ref 8.6–10.2)
CHLORIDE BLD-SCNC: 95 MMOL/L (ref 100–108)
CHLORIDE SERPL-SCNC: 95 MMOL/L (ref 98–107)
CO2 BLD CALC-SCNC: 35 MMOL/L (ref 22–29)
CO2 SERPL-SCNC: 32 MMOL/L (ref 22–29)
CREAT BLD-MCNC: 1 MG/DL (ref 0.7–1.2)
CREAT SERPL-MCNC: 1.2 MG/DL (ref 0.7–1.2)
CRITICAL NOTIFICATION DATE/TIME: NORMAL
EGFR, POC: 80 ML/MIN/1.73M2
EOSINOPHIL # BLD: 0.02 K/UL (ref 0.05–0.5)
EOSINOPHILS RELATIVE PERCENT: 0 % (ref 0–6)
ERYTHROCYTE [DISTWIDTH] IN BLOOD BY AUTOMATED COUNT: 15.9 % (ref 11.5–15)
FLUAV RNA RESP QL NAA+PROBE: NOT DETECTED
FLUBV RNA RESP QL NAA+PROBE: NOT DETECTED
GFR, ESTIMATED: 67 ML/MIN/1.73M2
GLUCOSE BLD-MCNC: 95 MG/DL (ref 74–99)
GLUCOSE SERPL-MCNC: 96 MG/DL (ref 74–99)
HCT VFR BLD AUTO: 46.8 % (ref 37–54)
HGB BLD-MCNC: 14.4 G/DL (ref 12.5–16.5)
IMM GRANULOCYTES # BLD AUTO: <0.03 K/UL (ref 0–0.58)
IMM GRANULOCYTES NFR BLD: 0 % (ref 0–5)
LACTATE BLDV-SCNC: 0.9 MMOL/L (ref 0.5–1.9)
LACTATE BLDV-SCNC: 1.1 MMOL/L (ref 0.5–1.9)
LYMPHOCYTES NFR BLD: 1.49 K/UL (ref 1.5–4)
LYMPHOCYTES RELATIVE PERCENT: 25 % (ref 20–42)
MCH RBC QN AUTO: 22.8 PG (ref 26–35)
MCHC RBC AUTO-ENTMCNC: 30.8 G/DL (ref 32–34.5)
MCV RBC AUTO: 74.2 FL (ref 80–99.9)
MONOCYTES NFR BLD: 0.41 K/UL (ref 0.1–0.95)
MONOCYTES NFR BLD: 7 % (ref 2–12)
NEUTROPHILS NFR BLD: 68 % (ref 43–80)
NEUTS SEG NFR BLD: 4.08 K/UL (ref 1.8–7.3)
PLATELET # BLD AUTO: 260 K/UL (ref 130–450)
PMV BLD AUTO: 10 FL (ref 7–12)
POC ANION GAP: 9 MMOL/L (ref 7–16)
POTASSIUM BLD-SCNC: 3.8 MMOL/L (ref 3.5–5)
POTASSIUM SERPL-SCNC: 3.9 MMOL/L (ref 3.5–5)
PROT SERPL-MCNC: 8.6 G/DL (ref 6.4–8.3)
RBC # BLD AUTO: 6.31 M/UL (ref 3.8–5.8)
SARS-COV-2 RNA RESP QL NAA+PROBE: NOT DETECTED
SODIUM BLD-SCNC: 139 MMOL/L (ref 132–146)
SODIUM SERPL-SCNC: 136 MMOL/L (ref 132–146)
SOURCE: NORMAL
SPECIMEN DESCRIPTION: NORMAL
TROPONIN I SERPL HS-MCNC: 27 NG/L (ref 0–11)
TROPONIN I SERPL HS-MCNC: 30 NG/L (ref 0–11)
TROPONIN I SERPL HS-MCNC: NORMAL NG/L (ref 0–22)
WBC OTHER # BLD: 6 K/UL (ref 4.5–11.5)

## 2024-12-02 PROCEDURE — 96361 HYDRATE IV INFUSION ADD-ON: CPT

## 2024-12-02 PROCEDURE — 82947 ASSAY GLUCOSE BLOOD QUANT: CPT

## 2024-12-02 PROCEDURE — 6360000004 HC RX CONTRAST MEDICATION: Performed by: RADIOLOGY

## 2024-12-02 PROCEDURE — 84484 ASSAY OF TROPONIN QUANT: CPT

## 2024-12-02 PROCEDURE — 6360000002 HC RX W HCPCS: Performed by: STUDENT IN AN ORGANIZED HEALTH CARE EDUCATION/TRAINING PROGRAM

## 2024-12-02 PROCEDURE — 99285 EMERGENCY DEPT VISIT HI MDM: CPT

## 2024-12-02 PROCEDURE — 87040 BLOOD CULTURE FOR BACTERIA: CPT

## 2024-12-02 PROCEDURE — 84520 ASSAY OF UREA NITROGEN: CPT

## 2024-12-02 PROCEDURE — 83880 ASSAY OF NATRIURETIC PEPTIDE: CPT

## 2024-12-02 PROCEDURE — 96374 THER/PROPH/DIAG INJ IV PUSH: CPT

## 2024-12-02 PROCEDURE — 85025 COMPLETE CBC W/AUTO DIFF WBC: CPT

## 2024-12-02 PROCEDURE — 82565 ASSAY OF CREATININE: CPT

## 2024-12-02 PROCEDURE — 80053 COMPREHEN METABOLIC PANEL: CPT

## 2024-12-02 PROCEDURE — 6370000000 HC RX 637 (ALT 250 FOR IP): Performed by: STUDENT IN AN ORGANIZED HEALTH CARE EDUCATION/TRAINING PROGRAM

## 2024-12-02 PROCEDURE — 71275 CT ANGIOGRAPHY CHEST: CPT

## 2024-12-02 PROCEDURE — 83605 ASSAY OF LACTIC ACID: CPT

## 2024-12-02 PROCEDURE — 2580000003 HC RX 258: Performed by: STUDENT IN AN ORGANIZED HEALTH CARE EDUCATION/TRAINING PROGRAM

## 2024-12-02 PROCEDURE — 80051 ELECTROLYTE PANEL: CPT

## 2024-12-02 PROCEDURE — 87636 SARSCOV2 & INF A&B AMP PRB: CPT

## 2024-12-02 PROCEDURE — 36415 COLL VENOUS BLD VENIPUNCTURE: CPT

## 2024-12-02 PROCEDURE — 93005 ELECTROCARDIOGRAM TRACING: CPT | Performed by: STUDENT IN AN ORGANIZED HEALTH CARE EDUCATION/TRAINING PROGRAM

## 2024-12-02 RX ORDER — ALBUTEROL SULFATE 90 UG/1
2 INHALANT RESPIRATORY (INHALATION) 4 TIMES DAILY PRN
Qty: 18 G | Refills: 0 | Status: SHIPPED | OUTPATIENT
Start: 2024-12-02

## 2024-12-02 RX ORDER — IOPAMIDOL 755 MG/ML
70 INJECTION, SOLUTION INTRAVASCULAR
Status: COMPLETED | OUTPATIENT
Start: 2024-12-02 | End: 2024-12-02

## 2024-12-02 RX ORDER — PREDNISONE 20 MG/1
20 TABLET ORAL 2 TIMES DAILY
Qty: 10 TABLET | Refills: 0 | Status: SHIPPED | OUTPATIENT
Start: 2024-12-02 | End: 2024-12-07

## 2024-12-02 RX ORDER — DOXYCYCLINE HYCLATE 100 MG
100 TABLET ORAL 2 TIMES DAILY
Qty: 14 TABLET | Refills: 0 | Status: SHIPPED | OUTPATIENT
Start: 2024-12-02 | End: 2024-12-09

## 2024-12-02 RX ORDER — IPRATROPIUM BROMIDE AND ALBUTEROL SULFATE 2.5; .5 MG/3ML; MG/3ML
1 SOLUTION RESPIRATORY (INHALATION) ONCE
Status: COMPLETED | OUTPATIENT
Start: 2024-12-02 | End: 2024-12-02

## 2024-12-02 RX ORDER — 0.9 % SODIUM CHLORIDE 0.9 %
1000 INTRAVENOUS SOLUTION INTRAVENOUS ONCE
Status: COMPLETED | OUTPATIENT
Start: 2024-12-02 | End: 2024-12-02

## 2024-12-02 RX ADMIN — IOPAMIDOL 70 ML: 755 INJECTION, SOLUTION INTRAVENOUS at 19:59

## 2024-12-02 RX ADMIN — IPRATROPIUM BROMIDE AND ALBUTEROL SULFATE 1 DOSE: 2.5; .5 SOLUTION RESPIRATORY (INHALATION) at 21:20

## 2024-12-02 RX ADMIN — SODIUM CHLORIDE 1000 ML: 9 INJECTION, SOLUTION INTRAVENOUS at 21:14

## 2024-12-02 RX ADMIN — IPRATROPIUM BROMIDE AND ALBUTEROL SULFATE 1 DOSE: 2.5; .5 SOLUTION RESPIRATORY (INHALATION) at 18:06

## 2024-12-02 RX ADMIN — WATER 125 MG: 1 INJECTION INTRAMUSCULAR; INTRAVENOUS; SUBCUTANEOUS at 20:29

## 2024-12-02 ASSESSMENT — ENCOUNTER SYMPTOMS
BACK PAIN: 0
NAUSEA: 0
CHEST TIGHTNESS: 0
SHORTNESS OF BREATH: 1
ABDOMINAL PAIN: 0
ABDOMINAL DISTENTION: 0
COUGH: 1
VOMITING: 0
PHOTOPHOBIA: 0
DIARRHEA: 0

## 2024-12-02 ASSESSMENT — LIFESTYLE VARIABLES: HOW OFTEN DO YOU HAVE A DRINK CONTAINING ALCOHOL: NEVER

## 2024-12-02 NOTE — ED PROVIDER NOTES
Sinus  Interpretation: non-specific EKG, no st elevation  Comparison: no previous EKG   [SS]      ED Course User Index  [SS] Demetrice Fine MD       --------------------------------------------- PAST HISTORY ---------------------------------------------  Past Medical History:  has a past medical history of Asthma, COPD (chronic obstructive pulmonary disease) (HCC), Ex-smoker for more than 1 year, [place] (hard of hearing), Hypertension, and Neck mass.    Past Surgical History:  has a past surgical history that includes knee surgery (09/16/2019); Colonoscopy (2012); and Neck surgery (Left, 6/9/2020).    Social History:  reports that he quit smoking about 5 years ago. His smoking use included cigarettes. He started smoking about 59 years ago. He has a 54 pack-year smoking history. He has never used smokeless tobacco. He reports that he does not drink alcohol and does not use drugs.    Family History: family history includes Heart Attack (age of onset: 47) in his brother; Heart Attack (age of onset: 50) in his brother; Heart Attack (age of onset: 56) in his brother; Heart Attack (age of onset: 59) in his sister; Heart Attack (age of onset: 73) in his brother.     The patient’s home medications have been reviewed.    Allergies: Patient has no known allergies.    -------------------------------------------------- RESULTS -------------------------------------------------  Labs:  Results for orders placed or performed during the hospital encounter of 12/02/24   Culture, Blood 1    Specimen: Blood   Result Value Ref Range    Specimen Description .BLOOD     Special Requests          Culture NO GROWTH 1 DAY    Culture, Blood 2    Specimen: Blood   Result Value Ref Range    Specimen Description .BLOOD     Special Requests          Culture NO GROWTH 1 DAY    COVID-19 & Influenza Combo    Specimen: Nasopharyngeal Swab   Result Value Ref Range    Specimen Description .NASOPHARYNGEAL SWAB     Source .NASOPHARYNGEAL SWAB

## 2024-12-03 LAB
EKG ATRIAL RATE: 112 BPM
EKG P AXIS: 76 DEGREES
EKG P-R INTERVAL: 130 MS
EKG Q-T INTERVAL: 348 MS
EKG QRS DURATION: 90 MS
EKG QTC CALCULATION (BAZETT): 475 MS
EKG R AXIS: 75 DEGREES
EKG T AXIS: 46 DEGREES
EKG VENTRICULAR RATE: 112 BPM

## 2024-12-03 PROCEDURE — 93010 ELECTROCARDIOGRAM REPORT: CPT | Performed by: INTERNAL MEDICINE

## 2024-12-03 NOTE — ED NOTES
Patient O2 saturation while ambulating maintianed at 93% on room air.  Patient HR 120s while ambulating.  States the SOB is intermittent.

## 2024-12-07 LAB
MICROORGANISM SPEC CULT: NORMAL
MICROORGANISM SPEC CULT: NORMAL
SERVICE CMNT-IMP: NORMAL
SERVICE CMNT-IMP: NORMAL
SPECIMEN DESCRIPTION: NORMAL
SPECIMEN DESCRIPTION: NORMAL

## (undated) DEVICE — 4-PORT MANIFOLD: Brand: NEPTUNE 2

## (undated) DEVICE — ADHESIVE SKIN CLSR 0.7ML TOP DERMBND ADV

## (undated) DEVICE — RETRACTOR WEITLANER BLUNT

## (undated) DEVICE — SHEET, T, LAPAROTOMY, STERILE: Brand: MEDLINE

## (undated) DEVICE — SUTURE VCRL + SZ 4-0 L27IN ABSRB UD PS-2 3/8 CIR REV CUT VCP426H

## (undated) DEVICE — ELECTRODE PT RET AD L9FT HI MOIST COND ADH HYDRGEL CORDED

## (undated) DEVICE — TUBING SUCT 12FR MAL ALUM SHFT FN CAP VENT UNIV CONN W/ OBT

## (undated) DEVICE — TOWEL,OR,DSP,ST,BLUE,STD,6/PK,12PK/CS: Brand: MEDLINE

## (undated) DEVICE — NEEDLE HYPO 25GA L1.5IN BLU POLYPR HUB S STL REG BVL STR

## (undated) DEVICE — BASIC SINGLE BASIN 1-LF: Brand: MEDLINE INDUSTRIES, INC.

## (undated) DEVICE — PACK PROCEDURE SURG GEN CUST

## (undated) DEVICE — COVER HNDL LT DISP

## (undated) DEVICE — APPLICATOR MEDICATED 26 CC SOLUTION HI LT ORNG CHLORAPREP

## (undated) DEVICE — NEEDLE HYPO 27GA L1.25IN GRY POLYPR HUB S STL REG BVL STR